# Patient Record
Sex: FEMALE | Race: WHITE | NOT HISPANIC OR LATINO | Employment: PART TIME | URBAN - METROPOLITAN AREA
[De-identification: names, ages, dates, MRNs, and addresses within clinical notes are randomized per-mention and may not be internally consistent; named-entity substitution may affect disease eponyms.]

---

## 2017-04-25 ENCOUNTER — APPOINTMENT (OUTPATIENT)
Dept: ADMISSIONS | Facility: MEDICAL CENTER | Age: 27
End: 2017-04-25
Attending: OBSTETRICS & GYNECOLOGY
Payer: MEDICAID

## 2017-04-26 ENCOUNTER — HOSPITAL ENCOUNTER (OUTPATIENT)
Facility: MEDICAL CENTER | Age: 27
End: 2017-04-26
Attending: OBSTETRICS & GYNECOLOGY | Admitting: OBSTETRICS & GYNECOLOGY
Payer: MEDICAID

## 2017-04-26 VITALS
TEMPERATURE: 98 F | DIASTOLIC BLOOD PRESSURE: 59 MMHG | OXYGEN SATURATION: 98 % | RESPIRATION RATE: 16 BRPM | HEART RATE: 68 BPM | SYSTOLIC BLOOD PRESSURE: 100 MMHG | WEIGHT: 147.49 LBS | BODY MASS INDEX: 23.15 KG/M2 | HEIGHT: 67 IN

## 2017-04-26 PROBLEM — A63.0 CONDYLOMA ACUMINATUM: Status: ACTIVE | Noted: 2017-04-26

## 2017-04-26 LAB
BASOPHILS # BLD AUTO: 0.8 % (ref 0–1.8)
BASOPHILS # BLD: 0.06 K/UL (ref 0–0.12)
EOSINOPHIL # BLD AUTO: 0.16 K/UL (ref 0–0.51)
EOSINOPHIL NFR BLD: 2.1 % (ref 0–6.9)
ERYTHROCYTE [DISTWIDTH] IN BLOOD BY AUTOMATED COUNT: 40.8 FL (ref 35.9–50)
HCG SERPL QL: NEGATIVE
HCT VFR BLD AUTO: 40.8 % (ref 37–47)
HGB BLD-MCNC: 13.3 G/DL (ref 12–16)
IMM GRANULOCYTES # BLD AUTO: 0.03 K/UL (ref 0–0.11)
IMM GRANULOCYTES NFR BLD AUTO: 0.4 % (ref 0–0.9)
LYMPHOCYTES # BLD AUTO: 2.19 K/UL (ref 1–4.8)
LYMPHOCYTES NFR BLD: 28.6 % (ref 22–41)
MCH RBC QN AUTO: 30 PG (ref 27–33)
MCHC RBC AUTO-ENTMCNC: 32.6 G/DL (ref 33.6–35)
MCV RBC AUTO: 91.9 FL (ref 81.4–97.8)
MONOCYTES # BLD AUTO: 0.71 K/UL (ref 0–0.85)
MONOCYTES NFR BLD AUTO: 9.3 % (ref 0–13.4)
NEUTROPHILS # BLD AUTO: 4.5 K/UL (ref 2–7.15)
NEUTROPHILS NFR BLD: 58.8 % (ref 44–72)
NRBC # BLD AUTO: 0 K/UL
NRBC BLD AUTO-RTO: 0 /100 WBC
PLATELET # BLD AUTO: 194 K/UL (ref 164–446)
PMV BLD AUTO: 11.1 FL (ref 9–12.9)
RBC # BLD AUTO: 4.44 M/UL (ref 4.2–5.4)
WBC # BLD AUTO: 7.7 K/UL (ref 4.8–10.8)

## 2017-04-26 PROCEDURE — 700111 HCHG RX REV CODE 636 W/ 250 OVERRIDE (IP)

## 2017-04-26 PROCEDURE — 85025 COMPLETE CBC W/AUTO DIFF WBC: CPT

## 2017-04-26 PROCEDURE — 160038 HCHG SURGERY MINUTES - EA ADDL 1 MIN LEVEL 2: Performed by: OBSTETRICS & GYNECOLOGY

## 2017-04-26 PROCEDURE — 700101 HCHG RX REV CODE 250

## 2017-04-26 PROCEDURE — 110371 HCHG SHELL REV 272: Performed by: OBSTETRICS & GYNECOLOGY

## 2017-04-26 PROCEDURE — 160002 HCHG RECOVERY MINUTES (STAT): Performed by: OBSTETRICS & GYNECOLOGY

## 2017-04-26 PROCEDURE — 160009 HCHG ANES TIME/MIN: Performed by: OBSTETRICS & GYNECOLOGY

## 2017-04-26 PROCEDURE — 160036 HCHG PACU - EA ADDL 30 MINS PHASE I: Performed by: OBSTETRICS & GYNECOLOGY

## 2017-04-26 PROCEDURE — A9270 NON-COVERED ITEM OR SERVICE: HCPCS

## 2017-04-26 PROCEDURE — 88304 TISSUE EXAM BY PATHOLOGIST: CPT

## 2017-04-26 PROCEDURE — 160048 HCHG OR STATISTICAL LEVEL 1-5: Performed by: OBSTETRICS & GYNECOLOGY

## 2017-04-26 PROCEDURE — 500892 HCHG PACK, PERI-GYN: Performed by: OBSTETRICS & GYNECOLOGY

## 2017-04-26 PROCEDURE — 502240 HCHG MISC OR SUPPLY RC 0272: Performed by: OBSTETRICS & GYNECOLOGY

## 2017-04-26 PROCEDURE — 110382 HCHG SHELL REV 271: Performed by: OBSTETRICS & GYNECOLOGY

## 2017-04-26 PROCEDURE — 84703 CHORIONIC GONADOTROPIN ASSAY: CPT

## 2017-04-26 PROCEDURE — 501838 HCHG SUTURE GENERAL: Performed by: OBSTETRICS & GYNECOLOGY

## 2017-04-26 PROCEDURE — 160035 HCHG PACU - 1ST 60 MINS PHASE I: Performed by: OBSTETRICS & GYNECOLOGY

## 2017-04-26 PROCEDURE — A4606 OXYGEN PROBE USED W OXIMETER: HCPCS | Performed by: OBSTETRICS & GYNECOLOGY

## 2017-04-26 PROCEDURE — 700102 HCHG RX REV CODE 250 W/ 637 OVERRIDE(OP)

## 2017-04-26 PROCEDURE — 160027 HCHG SURGERY MINUTES - 1ST 30 MINS LEVEL 2: Performed by: OBSTETRICS & GYNECOLOGY

## 2017-04-26 RX ORDER — OXYCODONE HYDROCHLORIDE 5 MG/1
10 TABLET ORAL
Status: DISCONTINUED | OUTPATIENT
Start: 2017-04-26 | End: 2017-04-26 | Stop reason: HOSPADM

## 2017-04-26 RX ORDER — ONDANSETRON 2 MG/ML
4 INJECTION INTRAMUSCULAR; INTRAVENOUS EVERY 6 HOURS PRN
Status: DISCONTINUED | OUTPATIENT
Start: 2017-04-26 | End: 2017-04-26 | Stop reason: HOSPADM

## 2017-04-26 RX ORDER — ACETAMINOPHEN 500 MG
1000 TABLET ORAL EVERY 6 HOURS
Status: DISCONTINUED | OUTPATIENT
Start: 2017-04-26 | End: 2017-04-26 | Stop reason: HOSPADM

## 2017-04-26 RX ORDER — MIDAZOLAM HYDROCHLORIDE 1 MG/ML
INJECTION INTRAMUSCULAR; INTRAVENOUS
Status: DISCONTINUED
Start: 2017-04-26 | End: 2017-04-26 | Stop reason: HOSPADM

## 2017-04-26 RX ORDER — MORPHINE SULFATE 4 MG/ML
4 INJECTION, SOLUTION INTRAMUSCULAR; INTRAVENOUS
Status: DISCONTINUED | OUTPATIENT
Start: 2017-04-26 | End: 2017-04-26 | Stop reason: HOSPADM

## 2017-04-26 RX ORDER — METOCLOPRAMIDE HYDROCHLORIDE 5 MG/ML
10 INJECTION INTRAMUSCULAR; INTRAVENOUS EVERY 4 HOURS PRN
Status: DISCONTINUED | OUTPATIENT
Start: 2017-04-26 | End: 2017-04-26 | Stop reason: HOSPADM

## 2017-04-26 RX ORDER — OXYCODONE HYDROCHLORIDE 5 MG/1
5 TABLET ORAL
Status: DISCONTINUED | OUTPATIENT
Start: 2017-04-26 | End: 2017-04-26 | Stop reason: HOSPADM

## 2017-04-26 RX ORDER — SODIUM CHLORIDE, SODIUM LACTATE, POTASSIUM CHLORIDE, CALCIUM CHLORIDE 600; 310; 30; 20 MG/100ML; MG/100ML; MG/100ML; MG/100ML
INJECTION, SOLUTION INTRAVENOUS ONCE
Status: COMPLETED | OUTPATIENT
Start: 2017-04-26 | End: 2017-04-26

## 2017-04-26 RX ORDER — SIMETHICONE 80 MG
80 TABLET,CHEWABLE ORAL EVERY 8 HOURS PRN
Status: DISCONTINUED | OUTPATIENT
Start: 2017-04-26 | End: 2017-04-26 | Stop reason: HOSPADM

## 2017-04-26 RX ORDER — LORAZEPAM 2 MG/ML
INJECTION INTRAMUSCULAR
Status: COMPLETED
Start: 2017-04-26 | End: 2017-04-26

## 2017-04-26 RX ORDER — LORAZEPAM 2 MG/ML
0.5 INJECTION INTRAMUSCULAR
Status: DISCONTINUED | OUTPATIENT
Start: 2017-04-26 | End: 2017-04-26 | Stop reason: HOSPADM

## 2017-04-26 RX ORDER — BUPIVACAINE HYDROCHLORIDE AND EPINEPHRINE 2.5; 5 MG/ML; UG/ML
INJECTION, SOLUTION EPIDURAL; INFILTRATION; INTRACAUDAL; PERINEURAL
Status: DISCONTINUED | OUTPATIENT
Start: 2017-04-26 | End: 2017-04-26 | Stop reason: HOSPADM

## 2017-04-26 RX ORDER — IBUPROFEN 400 MG/1
800 TABLET ORAL
Status: DISCONTINUED | OUTPATIENT
Start: 2017-04-26 | End: 2017-04-26 | Stop reason: HOSPADM

## 2017-04-26 RX ORDER — ACETIC ACID 0.25 G/100ML
IRRIGANT IRRIGATION
Status: DISCONTINUED | OUTPATIENT
Start: 2017-04-26 | End: 2017-04-26 | Stop reason: HOSPADM

## 2017-04-26 RX ADMIN — SODIUM CHLORIDE, SODIUM LACTATE, POTASSIUM CHLORIDE, CALCIUM CHLORIDE 1000 ML: 600; 310; 30; 20 INJECTION, SOLUTION INTRAVENOUS at 13:00

## 2017-04-26 RX ADMIN — LORAZEPAM 0.5 MG: 2 INJECTION INTRAMUSCULAR; INTRAVENOUS at 14:48

## 2017-04-26 ASSESSMENT — PAIN SCALES - GENERAL
PAINLEVEL_OUTOF10: 0

## 2017-04-26 NOTE — OR NURSING
RECEIVED FROM OR WITH DR ALAN.  PATIENT AWAKE.  VSS.  DENIES PAIN/NAUSEA.  REQUESTS ANTI-ANXIETY MEDICATION.  DR DORANTES AT BEDSIDE.  ORDERS RECEIVED AND IMPLEMENTED.  1505 ANXIETY IMPROVING.  DRINKING GINGER ALE.   1545 AMBULATES TO BATHROOM; VOID, DRESSED AND FEELS READY FOR DISCHARGE.  NO BLEEDING NOTED.  INSTRUCTIONS GIVE TO PATIENT AND FAMILY.  ALL QUESTIONS ANSWERED.

## 2017-04-26 NOTE — OR SURGEON
Immediate Post-Operative Note      PreOp Diagnosis: 1.  SYMPTOMATIC PERIANAL CONDYLOMA.  2.  INTOLERANT OF IN OFFICE EXCISION.    PostOp Diagnosis: AS ABOVE.  PATHOLOGY PENDING.    Procedure(s):  PELVIC EXAM UNDER ANESTHESIA FOR EXCISION OF PERIANAL CONDYLOMA, REPAIR OF PERIANAL SKIN/TISSUE - Wound Class: Clean Contaminated    Surgeon(s):  Diamond Atkins M.D.    Anesthesiologist/Type of Anesthesia:  Anesthesiologist: Amilcar Clay M.D./General and local anesthetic    Surgical Staff:  Circulator: Kaya Amezquita R.N.; Deborah Guzman R.N.  Scrub Person: Bryan Ward    Specimen: 1.  Perianal condyloma.    Estimated Blood Loss: 10 cc    Findings: EUA - 1 x 2 x 1 cm verrucoid lesion just directly above the periphery of the anal mucosa at the 11:00 position.  3% acetic acid soaked gauze applied to her vulva, no obvious areas of ACWE noted.  Excised with scalpel and sent to pathology.    Bimanual examination - uterus AV mobile and not enlarged.  Bilateral adnexa are palpated and the right adnexa is not enlarged.  Question of left adnexal fullness.    Complications: None apparent.        4/26/2017 2:29 PM Diamond Atkins

## 2017-04-26 NOTE — H&P
PREOPERATIVE DIAGNOSES:  Symptomatic anal condyloma.      PLANNED PROCEDURE:  Examination under anesthesia, excision of anal condyloma   and repair of anal mucosa.      HISTORY OF PRESENT ILLNESS:  The patient is a 27-year-old nulligravid lesbian   woman with a last menstrual period of 04/02/2017 who has a long history of   perineal, vulvar and anal condyloma.  She is seen at the [facility]   by Mecca Cotton, who has performed TCA treatment on the smaller condyloma.    In general, over time this has helped decrease her condyloma symptoms.    However, there is a large 2x2x2 cm condyloma on her anus that her primary care   provider was not comfortable treating.  The patient desires conservative   surgical management and excision of this condyloma.  Risks, benefits and   alternatives of an examination under anesthesia, excision of anal condyloma   and repair and oversewing of anal mucosa discussed with the patient, and she   agrees to proceed.      PAST SURGICAL HISTORY:  None.      PAST MEDICAL HISTORY:  Genital warts.      SOCIAL HISTORY:  She denies alcohol, but is a one-half pack per day smoker as   well as a frequent marijuana smoker.      MEDICATIONS:  None.      BIRTH CONTROL:  None.      ALLERGIES:  No known drug allergies.      OBSTETRIC HISTORY:  None.  She denies a history of abnormal Pap smears.  She   does have history of genital warts.      PHYSICAL EXAMINATION:    VITAL SIGNS:  Stable, afebrile.    GENERAL:  Alert, awake and oriented x3 in no acute distress.    LUNGS:  Clear to auscultation bilaterally.    HEART:  Regular rate and rhythm.  No murmurs, rubs or gallops.  S1, S2 intact.      ABDOMEN:  Soft and nontender.    GENITOURINARY:  Deferred.    EXTREMITIES:  No clubbing, cyanosis or edema.      ASSESSMENT AND PLAN:  A 27-year-old nulligravid lesbian woman with a long   history of vulvar condyloma with a large anal condyloma that is not amenable   to in-office surgical excision.  The  patient desires outpatient conservative   surgical management.      PLAN:  We will proceed to the operating room for an examination under   anesthesia, excision of anal condyloma and repair of anal mucosa.       ____________________________________     MD GARTH PIERSON / NTS    DD:  04/25/2017 17:12:56  DT:  04/25/2017 18:43:12    D#:  [id number]  Job#:  [id number]    cc: JORDY VOGEL

## 2017-04-26 NOTE — DISCHARGE INSTRUCTIONS
ACTIVITY: Rest and take it easy for the first 24 hours.  A responsible adult is recommended to remain with you during that time.  It is normal to feel sleepy.  We encourage you to not do anything that requires balance, judgment or coordination.    MILD FLU-LIKE SYMPTOMS ARE NORMAL. YOU MAY EXPERIENCE GENERALIZED MUSCLE ACHES, THROAT IRRITATION, HEADACHE AND/OR SOME NAUSEA.    FOR 24 HOURS DO NOT:  Drive, operate machinery or run household appliances.  Drink beer or alcoholic beverages.   Make important decisions or sign legal documents.    SPECIAL INSTRUCTIONS: Call for a temp >100.4, heavy vaginal bleeding, foul smelling discharge, or if your incision oozes blood, pus, or fluid.  No driving x 2 weeks or while on narcotics, no lifting >20 pounds x 4 weeks, and pelvic rest x 6 weeks.  Keep bowels soft***    DIET: To avoid nausea, slowly advance diet as tolerated, avoiding spicy or greasy foods for the first day.  Add more substantial food to your diet according to your physician's instructions.  Babies can be fed formula or breast milk as soon as they are hungry.  INCREASE FLUIDS AND FIBER TO AVOID CONSTIPATION.    SURGICAL DRESSING/BATHING: ***    FOLLOW-UP APPOINTMENT:  A follow-up appointment should be arranged with your doctor; call to schedule.    You should CALL YOUR PHYSICIAN if you develop:  Fever greater than 101 degrees F.  Pain not relieved by medication, or persistent nausea or vomiting.  Excessive bleeding (blood soaking through dressing) or unexpected drainage from the wound.  Extreme redness or swelling around the incision site, drainage of pus or foul smelling drainage.  Inability to urinate or empty your bladder within 8 hours.  Problems with breathing or chest pain.    You should call 911 if you develop problems with breathing or chest pain.  If you are unable to contact your doctor or surgical center, you should go to the nearest emergency room or urgent care center.   physician's telephone #:  *[phone]**    If any questions arise, call your doctor.  If your doctor is not available, please feel free to call the Surgical Center at [phone].  The Center is open Monday through Friday from 7AM to 7PM.  You can also call the HEALTH HOTLINE open 24 hours/day, 7 days/week and speak to a nurse at [phone], or toll free at [phone].    A registered nurse may call you a few days after your surgery to see how you are doing after your procedure.    MEDICATIONS: Resume taking daily medication.  Take prescribed pain medication with food.  If no medication is prescribed, you may take non-aspirin pain medication if needed.  PAIN MEDICATION CAN BE VERY CONSTIPATING.  Take a stool softener or laxative such as senokot, pericolace, or milk of magnesia if needed.    Prescription given for *NONE**.  Last pain medication given at *NONE**.    If your physician has prescribed pain medication that includes Acetaminophen (Tylenol), do not take additional Acetaminophen (Tylenol) while taking the prescribed medication.    Depression / Suicide Risk    As you are discharged from this [facility] facility, it is important to learn how to keep safe from harming yourself.    Recognize the warning signs:  · Abrupt changes in personality, positive or negative- including increase in energy   · Giving away possessions  · Change in eating patterns- significant weight changes-  positive or negative  · Change in sleeping patterns- unable to sleep or sleeping all the time   · Unwillingness or inability to communicate  · Depression  · Unusual sadness, discouragement and loneliness  · Talk of wanting to die  · Neglect of personal appearance   · Rebelliousness- reckless behavior  · Withdrawal from people/activities they love  · Confusion- inability to concentrate     If you or a loved one observes any of these behaviors or has concerns about self-harm, here's what you can do:  · Talk about it- your feelings and reasons for harming  yourself  · Remove any means that you might use to hurt yourself (examples: pills, rope, extension cords, firearm)  · Get professional help from the community (Mental Health, Substance Abuse, psychological counseling)  · Do not be alone:Call your Safe Contact- someone whom you trust who will be there for you.  · Call your local CRISIS HOTLINE 066-6052 or 391-495-7411  · Call your local Children's Mobile Crisis Response Team [place] (419) 072-4306 or www.[employer]  · Call the toll free National Suicide Prevention Hotlines   · National Suicide Prevention Lifeline 303-293-XLSY (6904)  · National Hope Line Network 800-SUICIDE (893-5934)

## 2017-04-26 NOTE — IP AVS SNAPSHOT
4/26/2017    Celsa Unger  [address] [address]  [place] [address]    Dear Celsa:    [facility] wants to ensure your discharge home is safe and you or your loved ones have had all of your questions answered regarding your care after you leave the hospital.    Below is a list of resources and contact information should you have any questions regarding your hospital stay, follow-up instructions, or active medical symptoms.    Questions or Concerns Regarding… Contact   Medical Questions Related to Your Discharge  (7 days a week, 8am-5pm) Contact a Nurse Care Coordinator   [phone]   Medical Questions Not Related to Your Discharge  (24 hours a day / 7 days a week)  Contact the Nurse Health Line   [phone]    Medications or Discharge Instructions Refer to your discharge packet   or contact your [facility] Primary Care Provider   [phone]   Follow-up Appointment(s) Schedule your appointment via FanXT   or contact Scheduling [phone]   Billing Review your statement via FanXT  or contact Billing [phone]   Medical Records Review your records via FanXT   or contact Medical Records [phone]     You may receive a telephone call within two days of discharge. This call is to make certain you understand your discharge instructions and have the opportunity to have any questions answered. You can also easily access your medical information, test results and upcoming appointments via the FanXT free online health management tool. You can learn more and sign up at [employer]/FanXT. For assistance setting up your FanXT account, please call [phone].    Once again, we want to ensure your discharge home is safe and that you have a clear understanding of any next steps in your care. If you have any questions or concerns, please do not hesitate to contact us, we are here for you. Thank you for choosing [facility] for your healthcare needs.    Sincerely,    Your [facility] Healthcare Team

## 2017-04-26 NOTE — IP AVS SNAPSHOT
" Home Care Instructions                                                                                                                Name:Celsa Unger  Medical Record Number:[id number]  CSN: [id number]    YOB: 1990   Age: 27 y.o.  Sex: female  HT:1.702 m (5' 7\") WT: 66.9 kg (147 lb 7.8 oz)          Admit Date: 4/26/2017     Discharge Date:   Today's Date: 4/26/2017  Attending Doctor:  Diamond Atkins, *                  Allergies:  Review of patient's allergies indicates no known allergies.              Follow-up Information     1. Follow up with Diamond Atkins M.D. In 2 weeks.    Specialty:  OB/Gyn    Why:  Postoperative examination.    Contact information    [address] [facility] [address]  [facility] [address] [phone]          Discharge Instructions         ACTIVITY: Rest and take it easy for the first 24 hours.  A responsible adult is recommended to remain with you during that time.  It is normal to feel sleepy.  We encourage you to not do anything that requires balance, judgment or coordination.    MILD FLU-LIKE SYMPTOMS ARE NORMAL. YOU MAY EXPERIENCE GENERALIZED MUSCLE ACHES, THROAT IRRITATION, HEADACHE AND/OR SOME NAUSEA.    FOR 24 HOURS DO NOT:  Drive, operate machinery or run household appliances.  Drink beer or alcoholic beverages.   Make important decisions or sign legal documents.    SPECIAL INSTRUCTIONS: Call for a temp >100.4, heavy vaginal bleeding, foul smelling discharge, or if your incision oozes blood, pus, or fluid.  No driving x 2 weeks or while on narcotics, no lifting >20 pounds x 4 weeks, and pelvic rest x 6 weeks.  Keep bowels soft***    DIET: To avoid nausea, slowly advance diet as tolerated, avoiding spicy or greasy foods for the first day.  Add more substantial food to your diet according to your physician's instructions.  Babies can be fed formula or breast milk as soon as they are hungry.  INCREASE FLUIDS AND FIBER TO AVOID CONSTIPATION.    SURGICAL " DRESSING/BATHING: ***    FOLLOW-UP APPOINTMENT:  A follow-up appointment should be arranged with your doctor; call to schedule.    You should CALL YOUR PHYSICIAN if you develop:  Fever greater than 101 degrees F.  Pain not relieved by medication, or persistent nausea or vomiting.  Excessive bleeding (blood soaking through dressing) or unexpected drainage from the wound.  Extreme redness or swelling around the incision site, drainage of pus or foul smelling drainage.  Inability to urinate or empty your bladder within 8 hours.  Problems with breathing or chest pain.    You should call 911 if you develop problems with breathing or chest pain.  If you are unable to contact your doctor or surgical center, you should go to the nearest emergency room or urgent care center.   physician's telephone #: *[phone]**    If any questions arise, call your doctor.  If your doctor is not available, please feel free to call the Surgical Center at [phone].  The Center is open Monday through Friday from 7AM to 7PM.  You can also call the [facility] HOTLINE open 24 hours/day, 7 days/week and speak to a nurse at [phone], or toll free at [phone].    A registered nurse may call you a few days after your surgery to see how you are doing after your procedure.    MEDICATIONS: Resume taking daily medication.  Take prescribed pain medication with food.  If no medication is prescribed, you may take non-aspirin pain medication if needed.  PAIN MEDICATION CAN BE VERY CONSTIPATING.  Take a stool softener or laxative such as senokot, pericolace, or milk of magnesia if needed.    Prescription given for *NONE**.  Last pain medication given at *NONE**.    If your physician has prescribed pain medication that includes Acetaminophen (Tylenol), do not take additional Acetaminophen (Tylenol) while taking the prescribed medication.    Depression / Suicide Risk    As you are discharged from this [facility] facility, it is important to learn how  to keep safe from harming yourself.    Recognize the warning signs:  · Abrupt changes in personality, positive or negative- including increase in energy   · Giving away possessions  · Change in eating patterns- significant weight changes-  positive or negative  · Change in sleeping patterns- unable to sleep or sleeping all the time   · Unwillingness or inability to communicate  · Depression  · Unusual sadness, discouragement and loneliness  · Talk of wanting to die  · Neglect of personal appearance   · Rebelliousness- reckless behavior  · Withdrawal from people/activities they love  · Confusion- inability to concentrate     If you or a loved one observes any of these behaviors or has concerns about self-harm, here's what you can do:  · Talk about it- your feelings and reasons for harming yourself  · Remove any means that you might use to hurt yourself (examples: pills, rope, extension cords, firearm)  · Get professional help from the community (Mental Health, Substance Abuse, psychological counseling)  · Do not be alone:Call your Safe Contact- someone whom you trust who will be there for you.  · Call your local CRISIS HOTLINE 195-2013 or 373-258-3063  · Call your local Children's Mobile Crisis Response Team [place] (125) 503-7456 or www.[employer]  · Call the toll free National Suicide Prevention Hotlines   · National Suicide Prevention Lifeline 110-293-GMGX (9572)  · National Hope Line Network 800-SUICIDE (486-9135)       Medication List      Notice     You have not been prescribed any medications.            Medication Information     Above and/or attached are the medications your physician expects you to take upon discharge. Review all of your home medications and newly ordered medications with your doctor and/or pharmacist. Follow medication instructions as directed by your doctor and/or pharmacist. Please keep your medication list with you and share with your physician. Update the information when  medications are discontinued, doses are changed, or new medications (including over-the-counter products) are added; and carry medication information at all times in the event of emergency situations.        Resources     Quit Smoking / Tobacco Use:    I understand the use of any tobacco products increases my chance of suffering from future heart disease or stroke and could cause other illnesses which may shorten my life. Quitting the use of tobacco products is the single most important thing I can do to improve my health. For further information on smoking / tobacco cessation call a Toll Free Quit Line at 1-373.708.2925 (*National Cancer [place]) or 1-607.860.7900 (American Lung Association) or you can access the web based program at www.lungusa.org.    [place] Tobacco Users Help Line:  (731) 831-7470       Toll Free: 1-969.421.2327  Quit Tobacco Program [facility] (866)135-3499    Crisis Hotline:    [place] Crisis Hotline:  2-576-KRZNQJL or 1-416.522.7190    [place] Crisis Hotline:    1-148.934.7755 or 749-111-5290    Discharge Survey:   Thank you for choosing [facility]. We hope we did everything we could to make your hospital stay a pleasant one. You may be receiving a survey and we would appreciate your time and participation in answering the questions. Your input is very valuable to us in our efforts to improve our service to our patients and their families.            Signatures     My signature on this form indicates that:    1. I acknowledge receipt and understanding of these Home Care Instruction.  2. My questions regarding this information have been answered to my satisfaction.  3. I have formulated a plan with my discharge nurse to obtain my prescribed medications for home.    __________________________________      __________________________________                   Patient Signature                                 Guardian/Responsible Adult  Signature      __________________________________                 __________       ________                       Nurse Signature                                               Date                 Time

## 2017-04-27 NOTE — OP REPORT
DATE OF SERVICE:  04/26/2017    PREOPERATIVE DIAGNOSES:  1.  Symptomatic perianal condyloma.  2.  Intolerant of in-office excision.    POSTOPERATIVE DIAGNOSES:  1.  Symptomatic perianal condyloma.  2.  Intolerant of in-office excision.  3.  Pathology pending.    PROCEDURES PERFORMED:  Examination under anesthesia, excision of perianal   condyloma, repair of perianal skin.    SURGEON:  Diamond Atkins MD    ANESTHESIOLOGIST:  Amilcar Clay MD    ANESTHESIA:  General endotracheal tube anesthesia and local anesthetic.    SPECIMEN:  Perianal condyloma.    ESTIMATED BLOOD LOSS:  10 mL.    FINDINGS:  On examination under anesthesia, there is a 1 x 2 x 1 cm   verrucoid-appearing lesion just directly above the periphery of the anal   mucosa at the 11 o'clock position.  No other lesions noted, 3% acetic acid   soaked gauze applied to the vulva, no obvious areas of acetowhite epithelium   noted.  The tissue was excised with the scalpel and sent to pathology.    Bimanual examination, uterus is anteverted, mobile, and not enlarged.    Bilateral adnexa are palpated and the right adnexa is not enlarged.  There was   a question of an adnexal fullness.    COMPLICATIONS:  None apparent.    INDICATIONS FOR PROCEDURE:  The patient is a 27-year-old nulligravid lesbian   woman with a last menstrual period of 04/02/2017 who has a long history of   perineal vulvar and anal condyloma.  The patient is seen at the [facility] by Mecca Cotton who has performed TCA treatment on the smaller   condyloma.  The patient has had a persistent perianal condyloma that has   enlarged in the recent months and the patient is intolerant of in-office   excision and desires conservative surgical management.    DETAILS OF PROCEDURE:  The patient was taken to the operating room where she   underwent general endotracheal tube anesthesia.  She was then placed in the   dorsal lithotomy position in the [facility].  Examination under    anesthesia was performed and the findings are noted as above.  The tissue was   examined and 3% acetic acid soaked gauzes were placed across the vulva and   perianal region.  There were no obvious areas of acetowhite epithelium.  The   perianal condyloma was grasped with Allis clamps, elevated and the mucosal   tissue was injected with 0.25% Marcaine with epinephrine.  An elliptical-type   incision was made around the perianal tissue.  The tissue was sent to   pathology.  The wound was closed in a 2-layer closure with 3-0 Vicryl on an SH   needle.  The first layer was an interrupted layer to reapproximate the wound   and provide hemostasis and the remainder of the tissue was reapproximated in   an interrupted fashion.  The wound was found to be hemostatic.  Silvadene   cream was applied to the wound.  The patient was then taken out of dorsal   lithotomy position.  She was then extubated and taken to the PACU in stable   condition.       ____________________________________     MD GARTH PIERSON / NTS    DD:  04/26/2017 14:38:45  DT:  04/26/2017 17:53:23    D#:  [id number]  Job#:  [id number]    cc: JORDY VOGEL

## 2018-04-11 ENCOUNTER — APPOINTMENT (OUTPATIENT)
Dept: ADMISSIONS | Facility: MEDICAL CENTER | Age: 28
End: 2018-04-11
Attending: OBSTETRICS & GYNECOLOGY
Payer: MEDICAID

## 2018-04-17 ENCOUNTER — HOSPITAL ENCOUNTER (OUTPATIENT)
Facility: MEDICAL CENTER | Age: 28
End: 2018-04-17
Attending: OBSTETRICS & GYNECOLOGY | Admitting: OBSTETRICS & GYNECOLOGY
Payer: MEDICAID

## 2018-04-17 VITALS
BODY MASS INDEX: 26.16 KG/M2 | TEMPERATURE: 98 F | HEIGHT: 67 IN | DIASTOLIC BLOOD PRESSURE: 64 MMHG | HEART RATE: 92 BPM | SYSTOLIC BLOOD PRESSURE: 102 MMHG | RESPIRATION RATE: 18 BRPM | WEIGHT: 166.67 LBS | OXYGEN SATURATION: 100 %

## 2018-04-17 DIAGNOSIS — G89.18 POST-OPERATIVE PAIN: ICD-10-CM

## 2018-04-17 LAB
BASOPHILS # BLD AUTO: 0.6 % (ref 0–1.8)
BASOPHILS # BLD: 0.07 K/UL (ref 0–0.12)
EOSINOPHIL # BLD AUTO: 0.16 K/UL (ref 0–0.51)
EOSINOPHIL NFR BLD: 1.3 % (ref 0–6.9)
ERYTHROCYTE [DISTWIDTH] IN BLOOD BY AUTOMATED COUNT: 42.7 FL (ref 35.9–50)
HCG SERPL QL: NEGATIVE
HCT VFR BLD AUTO: 41.6 % (ref 37–47)
HGB BLD-MCNC: 13.5 G/DL (ref 12–16)
IMM GRANULOCYTES # BLD AUTO: 0.06 K/UL (ref 0–0.11)
IMM GRANULOCYTES NFR BLD AUTO: 0.5 % (ref 0–0.9)
LYMPHOCYTES # BLD AUTO: 2.22 K/UL (ref 1–4.8)
LYMPHOCYTES NFR BLD: 17.8 % (ref 22–41)
MCH RBC QN AUTO: 30.1 PG (ref 27–33)
MCHC RBC AUTO-ENTMCNC: 32.5 G/DL (ref 33.6–35)
MCV RBC AUTO: 92.7 FL (ref 81.4–97.8)
MONOCYTES # BLD AUTO: 0.91 K/UL (ref 0–0.85)
MONOCYTES NFR BLD AUTO: 7.3 % (ref 0–13.4)
NEUTROPHILS # BLD AUTO: 9.05 K/UL (ref 2–7.15)
NEUTROPHILS NFR BLD: 72.5 % (ref 44–72)
NRBC # BLD AUTO: 0 K/UL
NRBC BLD-RTO: 0 /100 WBC
PLATELET # BLD AUTO: 230 K/UL (ref 164–446)
PMV BLD AUTO: 11.6 FL (ref 9–12.9)
RBC # BLD AUTO: 4.49 M/UL (ref 4.2–5.4)
WBC # BLD AUTO: 12.5 K/UL (ref 4.8–10.8)

## 2018-04-17 PROCEDURE — 501330 HCHG SET, CYSTO IRRIG TUBING: Performed by: OBSTETRICS & GYNECOLOGY

## 2018-04-17 PROCEDURE — 700111 HCHG RX REV CODE 636 W/ 250 OVERRIDE (IP)

## 2018-04-17 PROCEDURE — 85025 COMPLETE CBC W/AUTO DIFF WBC: CPT

## 2018-04-17 PROCEDURE — 160029 HCHG SURGERY MINUTES - 1ST 30 MINS LEVEL 4: Performed by: OBSTETRICS & GYNECOLOGY

## 2018-04-17 PROCEDURE — 84703 CHORIONIC GONADOTROPIN ASSAY: CPT

## 2018-04-17 PROCEDURE — 160041 HCHG SURGERY MINUTES - EA ADDL 1 MIN LEVEL 4: Performed by: OBSTETRICS & GYNECOLOGY

## 2018-04-17 PROCEDURE — 501582 HCHG TROCAR, THRD BLADED: Performed by: OBSTETRICS & GYNECOLOGY

## 2018-04-17 PROCEDURE — 501838 HCHG SUTURE GENERAL: Performed by: OBSTETRICS & GYNECOLOGY

## 2018-04-17 PROCEDURE — 88112 CYTOPATH CELL ENHANCE TECH: CPT

## 2018-04-17 PROCEDURE — 160036 HCHG PACU - EA ADDL 30 MINS PHASE I: Performed by: OBSTETRICS & GYNECOLOGY

## 2018-04-17 PROCEDURE — 160048 HCHG OR STATISTICAL LEVEL 1-5: Performed by: OBSTETRICS & GYNECOLOGY

## 2018-04-17 PROCEDURE — 502240 HCHG MISC OR SUPPLY RC 0272: Performed by: OBSTETRICS & GYNECOLOGY

## 2018-04-17 PROCEDURE — 700104 HCHG RX REV CODE 254

## 2018-04-17 PROCEDURE — 500886 HCHG PACK, LAPAROSCOPY: Performed by: OBSTETRICS & GYNECOLOGY

## 2018-04-17 PROCEDURE — 700101 HCHG RX REV CODE 250

## 2018-04-17 PROCEDURE — A9270 NON-COVERED ITEM OR SERVICE: HCPCS

## 2018-04-17 PROCEDURE — A4338 INDWELLING CATHETER LATEX: HCPCS | Performed by: OBSTETRICS & GYNECOLOGY

## 2018-04-17 PROCEDURE — 501394 HCHG SOLUTION SORBITOL 3% 3000ML BAG: Performed by: OBSTETRICS & GYNECOLOGY

## 2018-04-17 PROCEDURE — 160035 HCHG PACU - 1ST 60 MINS PHASE I: Performed by: OBSTETRICS & GYNECOLOGY

## 2018-04-17 PROCEDURE — 500002 HCHG ADHESIVE, DERMABOND: Performed by: OBSTETRICS & GYNECOLOGY

## 2018-04-17 PROCEDURE — 160002 HCHG RECOVERY MINUTES (STAT): Performed by: OBSTETRICS & GYNECOLOGY

## 2018-04-17 PROCEDURE — 88305 TISSUE EXAM BY PATHOLOGIST: CPT | Mod: 59

## 2018-04-17 PROCEDURE — A6402 STERILE GAUZE <= 16 SQ IN: HCPCS | Performed by: OBSTETRICS & GYNECOLOGY

## 2018-04-17 PROCEDURE — 160009 HCHG ANES TIME/MIN: Performed by: OBSTETRICS & GYNECOLOGY

## 2018-04-17 PROCEDURE — 700102 HCHG RX REV CODE 250 W/ 637 OVERRIDE(OP)

## 2018-04-17 PROCEDURE — 502704 HCHG DEVICE, LIGASURE IMPACT: Performed by: OBSTETRICS & GYNECOLOGY

## 2018-04-17 PROCEDURE — 502587 HCHG PACK, D&C: Performed by: OBSTETRICS & GYNECOLOGY

## 2018-04-17 RX ORDER — OXYCODONE HCL 5 MG/5 ML
SOLUTION, ORAL ORAL
Status: COMPLETED
Start: 2018-04-17 | End: 2018-04-17

## 2018-04-17 RX ORDER — ONDANSETRON 4 MG/1
4 TABLET, ORALLY DISINTEGRATING ORAL EVERY 6 HOURS PRN
Qty: 10 TAB | Refills: 0 | Status: ON HOLD | OUTPATIENT
Start: 2018-04-17 | End: 2018-07-11

## 2018-04-17 RX ORDER — OXYCODONE HYDROCHLORIDE 5 MG/1
10 TABLET ORAL
Status: DISCONTINUED | OUTPATIENT
Start: 2018-04-17 | End: 2018-04-17 | Stop reason: HOSPADM

## 2018-04-17 RX ORDER — IBUPROFEN 800 MG/1
800 TABLET ORAL EVERY 8 HOURS PRN
Qty: 30 TAB | Refills: 3 | Status: SHIPPED | OUTPATIENT
Start: 2018-04-17

## 2018-04-17 RX ORDER — ACETAMINOPHEN 500 MG
1000 TABLET ORAL EVERY 6 HOURS
Status: DISCONTINUED | OUTPATIENT
Start: 2018-04-17 | End: 2018-04-17 | Stop reason: HOSPADM

## 2018-04-17 RX ORDER — MORPHINE SULFATE 4 MG/ML
6 INJECTION, SOLUTION INTRAMUSCULAR; INTRAVENOUS
Status: DISCONTINUED | OUTPATIENT
Start: 2018-04-17 | End: 2018-04-17 | Stop reason: HOSPADM

## 2018-04-17 RX ORDER — SENNA AND DOCUSATE SODIUM 50; 8.6 MG/1; MG/1
1 TABLET, FILM COATED ORAL 2 TIMES DAILY
Qty: 60 TAB | Refills: 1 | Status: ON HOLD | OUTPATIENT
Start: 2018-04-17 | End: 2018-07-11

## 2018-04-17 RX ORDER — OXYCODONE HYDROCHLORIDE AND ACETAMINOPHEN 5; 325 MG/1; MG/1
1-2 TABLET ORAL EVERY 4 HOURS PRN
Qty: 30 TAB | Refills: 0 | Status: SHIPPED | OUTPATIENT
Start: 2018-04-17 | End: 2018-04-24

## 2018-04-17 RX ORDER — BUPIVACAINE HYDROCHLORIDE AND EPINEPHRINE 2.5; 5 MG/ML; UG/ML
INJECTION, SOLUTION EPIDURAL; INFILTRATION; INTRACAUDAL; PERINEURAL
Status: DISCONTINUED | OUTPATIENT
Start: 2018-04-17 | End: 2018-04-17 | Stop reason: HOSPADM

## 2018-04-17 RX ORDER — SIMETHICONE 80 MG
80 TABLET,CHEWABLE ORAL EVERY 8 HOURS PRN
Status: DISCONTINUED | OUTPATIENT
Start: 2018-04-17 | End: 2018-04-17 | Stop reason: HOSPADM

## 2018-04-17 RX ORDER — ONDANSETRON 2 MG/ML
4 INJECTION INTRAMUSCULAR; INTRAVENOUS EVERY 6 HOURS PRN
Status: DISCONTINUED | OUTPATIENT
Start: 2018-04-17 | End: 2018-04-17 | Stop reason: HOSPADM

## 2018-04-17 RX ORDER — OXYCODONE HYDROCHLORIDE 5 MG/1
2.5 TABLET ORAL
Status: DISCONTINUED | OUTPATIENT
Start: 2018-04-17 | End: 2018-04-17 | Stop reason: HOSPADM

## 2018-04-17 RX ORDER — IBUPROFEN 200 MG
800 TABLET ORAL
Status: DISCONTINUED | OUTPATIENT
Start: 2018-04-17 | End: 2018-04-17 | Stop reason: HOSPADM

## 2018-04-17 RX ORDER — SODIUM CHLORIDE, SODIUM LACTATE, POTASSIUM CHLORIDE, CALCIUM CHLORIDE 600; 310; 30; 20 MG/100ML; MG/100ML; MG/100ML; MG/100ML
INJECTION, SOLUTION INTRAVENOUS CONTINUOUS
Status: DISCONTINUED | OUTPATIENT
Start: 2018-04-17 | End: 2018-04-17 | Stop reason: HOSPADM

## 2018-04-17 RX ADMIN — SODIUM CHLORIDE, SODIUM LACTATE, POTASSIUM CHLORIDE, CALCIUM CHLORIDE 1000 ML: 600; 310; 30; 20 INJECTION, SOLUTION INTRAVENOUS at 12:30

## 2018-04-17 RX ADMIN — OXYCODONE HYDROCHLORIDE 10 MG: 5 SOLUTION ORAL at 17:00

## 2018-04-17 ASSESSMENT — PAIN SCALES - GENERAL
PAINLEVEL_OUTOF10: 2
PAINLEVEL_OUTOF10: 0
PAINLEVEL_OUTOF10: 0
PAINLEVEL_OUTOF10: 2
PAINLEVEL_OUTOF10: 0
PAINLEVEL_OUTOF10: 6
PAINLEVEL_OUTOF10: 3
PAINLEVEL_OUTOF10: 0

## 2018-04-17 NOTE — OR SURGEON
Immediate Post OP Note    PreOp Diagnosis: 1.  Chronic pelvic pain.  2.  Dysmenorrhea.  3.  Menorrhagia.  4.  Possible endometrial polyp or submucosal fibroid.  5.  Bladder pain and urinary frequency.  6.  Desires conservative surgical management.    PostOp Diagnosis: As above.  Interstitial cystitis.  Endometriosis. Pelvic adhesive disease.  Left ovarian cyst.  Procedure(s):  PELVIC EXAM UNDER ANESTHESIA - Wound Class: Clean Contaminated  PELVISCOPY/WASHINGS/PERITONEAL BIOPSIES/INCISION/DRAINAGE OF RIGHT HEMORRHAGIC OVARIAN CYST - Wound Class: Clean  LYSIS ADHESIONS GYN/, EXCISION/ FULGURATION OF ENDOMETRIOSIS/PLACEMENT OF MAGDA - Wound Class: Clean  HYSTEROSCOPY WITH MYOSURE/ ENDOMETRIAL CURRETTAGE - Wound Class: Clean Contaminated  HYSTEROSCOPY WITH VIDEO OPERATIVE - Wound Class: Clean Contaminated  HYSTEROSCOPY WITH VIDEO DIAGNOSTIC - Wound Class: Clean Contaminated  CYSTOSCOPY/ WITH BLADDER DISTENTION - Wound Class: Clean Contaminated    Surgeon(s):  Diamond Atkins M.D.    Anesthesiologist/Type of Anesthesia:  Anesthesiologist: Warren Moncada M.D./General and local anesthetic.    Surgical Staff:  Circulator: Becky Lobo R.N.  Scrub Person: Tiarra Trinidad    Specimens removed if any:  1.  Washings.  2.  Peritoneal biopsies.  3.  Ovarian biopsies.  4.  Endometrial curettings.      Estimated Blood Loss: <25 cc    Findings: EUA: uterus is AV mobile and 6 weeks size.  No adnexal masses palpated.  Perineum appears without obvious masses or lesions.  Area of previous condyloma excision appears well healed without obvious dehiscence or bleeding noted.    Laparoscopic findings: normal bilateral tubes.  Normal right ovary.  Left ovary with a 1 cm hemorrhagic cyst filled with bloody fluid.  Punctate erythematous endometriosis implants noted on the ovarian cortex near the hemorrhagic cyst.  Bilateral ureters identified.  Pelvic peritoneum (anterior and posterior cul-de-sac) notable for dilated  and erythematous blood vessels.  Areas consistent with endometriosis noted but no obvious powder burn implants.  Bluish hue noted near the left IP ligament/ureter which represents likely endometriosis implant but in an area that is too dangerous to excise or fulgurate.  Left pelvic side wall sigmoid colon adhesions possibly related to endometriosis or possibly physiologic excised but also near the blood supply the the ovary and adherent to the colon thus unsafe to excise extensively.  Normal upper abdomen.    Cystoscopic findings: bilateral ureteral orifices seen and efflux of urine noted.  Bladder mucosa appears hyperemic with dilated and erythematous blood vessels.  No evidence of bladder masses or lesions.    Hysteroscopic findings: bilateral tubal ostia area identified.  No obvious endometrial polyps or submucosal fibroids or other masses or lesions noted.      Complications: none apparent        4/17/2018 2:13 PM Diamond Atkins M.D.

## 2018-04-17 NOTE — H&P
DATE OF PROCEDURE:  04/17/2018    PREOPERATIVE DIAGNOSES:  1.  Chronic pelvic pain.  2.  Dysmenorrhea.  3.  Abnormal uterine bleeding.  4.  Possible endometrial polyp or submucosal fibroid.  5.  Bladder pain.  6.  Desires conservative surgical management.    PLANNED PROCEDURE:  Examination under anesthesia, exploratory laparoscopy,   lysis of adhesions, excision and fulguration of endometriosis, implants,   diagnostic and operative hysteroscopy with excision of polyp or fibroid with   the MyoSure, endometrial curettage, cystoscopy, and bladder distention.    HISTORY OF PRESENT ILLNESS:  The patient is a 28-year-old nulligravid and   sexually active woman in a same sex relationship with a last menstrual period   of 04/02/2018, who has a long history of dysmenorrhea, abnormal uterine   bleeding and bladder pain, who desires conservative surgical management.  The   patient underwent a pelvic ultrasound on 10/26/2017, which demonstrated that   her uterus measured 7.8x3.6x3.7 cm.  Her endometrium measured 1.2 cm in   thickness.  Her right ovary measured 2.3x1.7x1.8 cm and containing 1.2 cm   simple ovarian cyst.  The left ovary measured 2.2x1.4x2.1 cm and was without   ovarian cyst.  There was no pelvic free fluid.  The patient has been taking   Flexeril 5 mg as needed 3 times a day for her pelvic pain and cramping as well   as ibuprofen.  She has never had an exploratory laparoscopy to diagnose her   with endometriosis.  The patient also has abnormal menstrual period with heavy   menses and intermenstrual bleeding.  In addition, she has bladder pain and   had been evaluated and tested negative for bladder infections.    The risks, benefits and alternatives of an examination under anesthesia,   exploratory laparoscopy, lysis of adhesions, excision and fulguration of   endometriosis implants, diagnostic and operative hysteroscopy, excision of   polyp or fibroid with the MyoSure, endometrial curettage and cystoscopy with    bladder distention were discussed with the patient and she agrees to proceed.    PAST MEDICAL HISTORY:  Chronic pelvic pain, low back pain, abnormal uterine   bleeding.    MEDICATIONS:  Cyclobenzaprine 5 mg p.o. t.i.d. originally prescribed by her   primary care physician.    ALLERGIES:  No known drug allergies.    SOCIAL HISTORY:  She has a partner.  She denies alcohol, tobacco, or drugs of   abuse.    PAST SURGICAL HISTORY:  None.    PHYSICAL EXAMINATION:  VITAL SIGNS:  Stable.  She is afebrile.  GENERAL:  Alert, awake and oriented x3, in no acute distress.  LUNGS:  Clear to auscultation bilaterally.  HEART:  Regular rate and rhythm.  No murmurs, rubs or gallops.  S1, S2 intact.  ABDOMEN:  Soft and nontender.  GENITOURINARY:  Deferred.  EXTREMITIES:  No clubbing, cyanosis, or edema.    Preoperative laboratory studies are pending.    ASSESSMENT AND PLAN:  A 28-year-old nulligravid, sexually active woman in same   sex relationship with a last menstrual period of 04/02/2018, who has a long   history of dysmenorrhea, menorrhagia and bladder pain, who has never been   evaluated for endometriosis and may have an endometrial polyp or fibroid and   possibly interstitial cystitis, who desires conservative surgical management.    PLAN:  We will proceed to the operating room for an examination under   anesthesia, exploratory laparoscopy, lysis of adhesions, excision and   fulguration of endometriosis implants, diagnostic and operative hysteroscopy,   excision of polyp or fibroid with the MyoSure, endometrial curettage, and   cystoscopy with bladder distention.  Risks, benefits and alternatives were   discussed and the patient agrees to proceed.       ____________________________________     SARAHY ADAMES MD    HTA / NTS    DD:  04/16/2018 16:41:29  DT:  04/16/2018 17:14:23    D#:  [id number]  Job#:  [id number]

## 2018-04-18 NOTE — DISCHARGE INSTRUCTIONS
ACTIVITY: Rest and take it easy for the first 24 hours.  A responsible adult is recommended to remain with you during that time.  It is normal to feel sleepy.  We encourage you to not do anything that requires balance, judgment or coordination.    MILD FLU-LIKE SYMPTOMS ARE NORMAL. YOU MAY EXPERIENCE GENERALIZED MUSCLE ACHES, THROAT IRRITATION, HEADACHE AND/OR SOME NAUSEA.    FOR 24 HOURS DO NOT:  Drive, operate machinery or run household appliances.  Drink beer or alcoholic beverages.   Make important decisions or sign legal documents.    SPECIAL INSTRUCTIONS: Call for a temp >100.4, heavy vaginal bleeding, foul smelling discharge, or if your incision oozes blood, pus, or fluid.  No driving x 2 weeks or while on narcotics, no lifting >20 pounds x 4 weeks, and pelvic rest x 6 weeks.    DIET: To avoid nausea, slowly advance diet as tolerated, avoiding spicy or greasy foods for the first day.  Add more substantial food to your diet according to your physician's instructions.  INCREASE FLUIDS AND FIBER TO AVOID CONSTIPATION.    SURGICAL DRESSING/BATHING: You may shower starting tomorrow. No tub baths, hot tubs, or swimming till your doctor okays it.    FOLLOW-UP APPOINTMENT:  A follow-up appointment should be arranged with your doctor in 1 week; call to schedule.    You should CALL YOUR PHYSICIAN if you develop:  Fever greater than 100.4 degrees F.  Pain not relieved by medication, or persistent nausea or vomiting.  Excessive bleeding (blood soaking through dressing) or unexpected drainage from the wound.  Extreme redness or swelling around the incision site, drainage of pus or foul smelling drainage.  Inability to urinate or empty your bladder within 8 hours.  Problems with breathing or chest pain.    You should call 911 if you develop problems with breathing or chest pain.  If you are unable to contact your doctor or surgical center, you should go to the nearest emergency room or urgent care center.  Physician's  telephone #: [phone].    If any questions arise, call your doctor.  If your doctor is not available, please feel free to call the Surgical Center at [phone].  The Center is open Monday through Friday from 7AM to 7PM.  You can also call the HEALTH HOTLINE open 24 hours/day, 7 days/week and speak to a nurse at [phone], or toll free at [phone].    A registered nurse may call you a few days after your surgery to see how you are doing after your procedure.    MEDICATIONS: Resume taking daily medication.  Take prescribed pain medication with food.  If no medication is prescribed, you may take non-aspirin pain medication if needed.  PAIN MEDICATION CAN BE VERY CONSTIPATING.  Take a stool softener or laxative such as senokot, pericolace, or milk of magnesia if needed.    Prescription given for Percocet, Ibuprofen, Zofran, and Senokot.  Last pain medication (similar to Percocet) given at 3:00 PM.    If your physician has prescribed pain medication that includes Acetaminophen (Tylenol), do not take additional Acetaminophen (Tylenol) while taking the prescribed medication.    Depression / Suicide Risk    As you are discharged from this [facility], it is important to learn how to keep safe from harming yourself.    Recognize the warning signs:  · Abrupt changes in personality, positive or negative- including increase in energy   · Giving away possessions  · Change in eating patterns- significant weight changes-  positive or negative  · Change in sleeping patterns- unable to sleep or sleeping all the time   · Unwillingness or inability to communicate  · Depression  · Unusual sadness, discouragement and loneliness  · Talk of wanting to die  · Neglect of personal appearance   · Rebelliousness- reckless behavior  · Withdrawal from people/activities they love  · Confusion- inability to concentrate     If you or a loved one observes any of these behaviors or has concerns about self-harm, here's what  you can do:  · Talk about it- your feelings and reasons for harming yourself  · Remove any means that you might use to hurt yourself (examples: pills, rope, extension cords, firearm)  · Get professional help from the community (Mental Health, Substance Abuse, psychological counseling)  · Do not be alone:Call your Safe Contact- someone whom you trust who will be there for you.  · Call your local CRISIS HOTLINE 207-5304 or 512-210-7705  · Call your local Children's Mobile Crisis Response Team [place] (558) 922-8085 or www.[employer]  · Call the toll free National Suicide Prevention Hotlines   · National Suicide Prevention Lifeline 926-404-MQAN (8098)  · National Hope Line Network 800-SUICIDE (465-4164)

## 2018-04-18 NOTE — OP REPORT
DATE OF SERVICE:  04/17/2018    PREOPERATIVE DIAGNOSES:  1.  Chronic pelvic pain.  2.  Dysmenorrhea.  3.  Menorrhagia.  4.  Possible endometrial polyp or submucosal fibroid.  5.  Bladder pain and urinary frequency.  6.  Desires conservative surgical management.    POSTOPERATIVE DIAGNOSES:  1.  Chronic pelvic pain.  2.  Dysmenorrhea.  3.  Menorrhagia.  4.  Possible endometrial polyp or submucosal fibroid.  5.  Bladder pain and urinary frequency.  6.  Desires conservative surgical management.  7.  Pelvic adhesive disease.  8.  Endometriosis.  9.  Interstitial cystitis.  10.  Left ovarian cyst.    PROCEDURE:  Examination under anesthesia, exploratory laparoscopy, washings,   peritoneal biopsies, incision and drainage of right hemorrhagic ovarian cyst,   lysis of adhesions, biopsy of endometriosis implants, placement of Lorie,   diagnostic and operative hysteroscopy, endometrial curettage, cystoscopy with   bladder distension.    SURGEON:  Diamond Atkins MD    ANESTHESIOLOGIST:  Warren Moncada MD    ANESTHESIA:  General endotracheal tube and local anesthetic.    SPECIMEN:  Washings, peritoneal biopsies, ovarian biopsy and endometrial   curettings.    ESTIMATED BLOOD LOSS:  Less than 25 mL.    FINDINGS:  On examination under anesthesia, her uterus is anteverted, mobile,   and 6 weeks size.  There are no adnexal masses palpated.  Her perineum appears   without obvious masses or lesions.  Area of previous condyloma excision   appears well healed without obvious dehiscence or bleeding noted.    Laparoscopic findings, normal bilateral tubes.  Normal right ovary.  Left   ovary with a 1 cm hemorrhagic bloody fluid-filled cyst.  Punctate erythematous   endometriosis implants noted on the ovarian cortex near the hemorrhagic cyst.    Bilateral ureters are identified.  Pelvic peritoneum, both the anterior and   posterior cul-de-sac is notable for dilated and erythematous blood vessels.    Areas consistent with  endometriosis noted, but no obvious biopsiable powder   burn implants noted.  There is a bluish hue noted near the left IP ligament   ureter which represents likely endometriosis implants, but an area that is too   dangerous to excise or fulgurate.  The left pelvic sidewall sigmoid colon   adhesions are noted, possibly related to endometriosis or possibly physiologic   which were partially excised, but also near the blood supply to the ovary and   adherent to the colon and thus safe to excise extensively.  Normal appearing   upper abdomen.    Cystoscopic findings, bilateral ureteral orifices seen and efflux of urine   noted.  Bladder mucosa appears hyperemic with dilated and erythematous blood   vessels.  No evidence of bladder masses or lesions.    Hysteroscopic findings, bilateral tubal ostia identified.  No obvious   endometrial polyps or submucosal fibroids or other masses or lesions noted.    Endometrium appears plush.    COMPLICATIONS:  None apparent.    INDICATIONS FOR THE PROCEDURE:  The patient is a 28-year-old nulligravid   sexually active woman in a same sex relationship, with the last menstrual   period of 4/2/18, who has a long history of dysmenorrhea, abnormal uterine   bleeding as well as bladder pain and urinary frequency who desires   conservative surgical management.    DETAILS OF THE PROCEDURE:  The patient was taken to the operating room where   she underwent general endotracheal tube anesthesia.  She was then placed in   the dorsal lithotomy position in the [facility].  An examination   under anesthesia was performed and the findings are noted as above.  The   patient was then prepped and draped in the dorsal lithotomy position.  A Fox   catheter was placed in her urinary bladder.    A medium Graves speculum was inserted into the vagina.  The cervix was   visualized.  The anterior lip of the cervix was grasped with a long Allis and   the endocervix was dilated to allow a #6 Sheri  manipulator to be placed within   the uterine cavity.  The Sheri manipulator was placed without difficulty.  The   Allis clamp and speculum were removed.    Attention was turned to the laparoscopic portion of the procedure, where a 10   mm infraumbilical skin incision was made with a scalpel after the instillation   of 0.25% Marcaine with epinephrine.  The incision was carried down to the   level of the fascia.  The fascia was grasped with Kocher clamps, elevated and   incised with Curiel scissors.  The incision was extended laterally with Curiel   scissors.  The S retractors were placed within the incision.  Either side of   the fascial incision was sutured with 0 Vicryl stay sutures.  The peritoneum   was identified and grasped with the Pean clamp.  The peritoneum was incised   with the Metzenbaum scissors.  The incision was extended laterally with the   Metzenbaum scissors.  The S retractors were placed within this incision and   intra-abdominal entry was confirmed.  The Cholo trocar was inserted under   direct visualization.  The CO2 gas was connected and the opening pressure was   8 mmHg.  The patient was placed in Trendelenburg.    The patient's pelvis and upper abdomen were explored and the findings are   noted as above.    A second trocar site was identified 3 fingerbreadths above the pubic   symphysis.  A 5 mm incision was made with a scalpel after the instillation of   0.25% Marcaine with epinephrine.  The 5 mm trocar was inserted under direct   visualization.  The uterus was elevated using the Sheri manipulator.  The   Prestige clamp was used to sweep the bowel into the upper abdomen and the   bilateral ureters were identified.  The bilateral fallopian tubes were   identified and followed out to the fimbriated ends.  The bilateral ovaries   were identified.  A left ovarian cyst was identified.  Pelvic washings were   performed using the suction  and these were sent to pathology.  The   left ovary was  grasped with the Prestige clamp, elevated and a left   hemorrhagic cyst was cauterized with the electrocautery.  This was then found   to be hemostatic.    The pelvis was thoroughly explored and all areas of endometriosis were   identified.  The endometriosis in general was diffuse and covered areas that   were unsafe for excision or fulguration.  There were adhesions on the left   pelvic sidewall that were bringing the left sigmoid colon up towards the left   adnexa.  These were cauterized with the electrocautery and bluntly dissected.    However, they were quite near the actual mucosa of the bowel and thus I did   not feel comfortable doing extensive adhesiolysis for fear of injuring the   mucosa of the bowel.    There were areas of the left ovarian cyst again identified and there was an   additional small area of bleeding noted on it.  The cyst was grasped and   excised and sent to pathology.    The pelvis was thoroughly irrigated with sterile water and Lorie was placed   on all of the peritoneal surfaces.    The trocars were removed under direct visualization.  The CO2 gas was released   from the patient's abdomen.  The fascia was reapproximated with 0 Vicryl.    The subcutaneous tissues were reapproximated with 0 Vicryl and the skin was   closed with 4-0 Vicryl.  The infraumbilical incision was coated in Dermabond.    The suprapubic incision was reapproximated with 4-0 Vicryl and coated with   Dermabond.    Attention was turned to the cystoscopic portion of the procedure.  The Fox   catheter was removed.  The 30-degree cystoscope was inserted and the bladder   was distended with 300 mL of saline.  These findings are noted as above.  The   Fox catheter was replaced.  The bladder was drained.    Attention was turned to the hysteroscopic portion of the procedure.  The Sheri   manipulator was removed from the patient's uterine cavity, the speculum was   inserted.  The cervix was grasped with a single tooth  tenaculum.  The   endocervix was dilated to allow the MyoSure camera to be inserted.  The   MyoSure camera was inserted under direct visualization.  The uterine cavity   was examined and the findings are noted as above.  The hysteroscope was   removed.  A gentle uterine curettage was performed and the specimen was sent   to pathology.  The tenaculum was removed and tenaculum site bleeding was made   hemostatic with silver nitrate and Monsel solution.  The speculum was removed.    The Fox catheter was removed.  The patient was taken out of dorsal   lithotomy position.  She was then extubated and taken to the PACU in stable   condition.  Sponge, lap, needle counts were correct x2.       ____________________________________     SARAHY ADAMES MD    HTA / NTS    DD:  04/17/2018 17:10:07  DT:  04/17/2018 17:57:02    D#:  [id number]  Job#:  [id number]

## 2018-04-18 NOTE — OR NURSING
1612 Arrives per cart to PACU, is sleepy. Monitors applied.  Handoff report received from anesthesia and OR nursing personnel.     1630 Restless, states repeatedly that needs to void. Given female urinal and reminded patient urethral catheter was in place during procedure and was removed at the end of the case. Spouse in to see.    1700 C/o some uterine cramping, rates at level 6-7. Medicated as ordered po.    1730 Rates pain at level 2. Up to bathroom with 1 assist, voided, dressing for discharge.    1750 Discharge instructions reviewed with patient and her spouse. They verbalized understanding. Patient discharged per wheelchair with belongings and discharge instructions. Spouse in accompaniment.          
Interspace located. Using sterile technique, the area was anesthetized. The needle was slowly inserted and advanced at the appropriate angle into the subarachnoid space to allow CSF return. Stylet withdrawn. Cerebral Spinal Fluid was evaluated and any required specimens were drawn. Stylet replaced, needle removed, skin cleaned, sterile dressing applied. Patient placed in supine position.

## 2018-07-11 ENCOUNTER — HOSPITAL ENCOUNTER (INPATIENT)
Facility: MEDICAL CENTER | Age: 28
LOS: 1 days | DRG: 743 | End: 2018-07-11
Attending: OBSTETRICS & GYNECOLOGY | Admitting: OBSTETRICS & GYNECOLOGY
Payer: MEDICAID

## 2018-07-11 VITALS
SYSTOLIC BLOOD PRESSURE: 104 MMHG | DIASTOLIC BLOOD PRESSURE: 56 MMHG | BODY MASS INDEX: 24.52 KG/M2 | RESPIRATION RATE: 16 BRPM | OXYGEN SATURATION: 100 % | HEART RATE: 75 BPM | WEIGHT: 156.53 LBS | TEMPERATURE: 98.2 F

## 2018-07-11 DIAGNOSIS — G89.18 PAIN FOLLOWING SURGERY OR PROCEDURE: ICD-10-CM

## 2018-07-11 LAB
ANION GAP SERPL CALC-SCNC: 11 MMOL/L (ref 0–11.9)
BASOPHILS # BLD AUTO: 0.6 % (ref 0–1.8)
BASOPHILS # BLD: 0.06 K/UL (ref 0–0.12)
BUN SERPL-MCNC: 8 MG/DL (ref 8–22)
CALCIUM SERPL-MCNC: 9.4 MG/DL (ref 8.5–10.5)
CHLORIDE SERPL-SCNC: 106 MMOL/L (ref 96–112)
CO2 SERPL-SCNC: 22 MMOL/L (ref 20–33)
CREAT SERPL-MCNC: 0.7 MG/DL (ref 0.5–1.4)
EOSINOPHIL # BLD AUTO: 0.14 K/UL (ref 0–0.51)
EOSINOPHIL NFR BLD: 1.5 % (ref 0–6.9)
ERYTHROCYTE [DISTWIDTH] IN BLOOD BY AUTOMATED COUNT: 43.2 FL (ref 35.9–50)
GLUCOSE SERPL-MCNC: 92 MG/DL (ref 65–99)
HCG SERPL QL: NEGATIVE
HCT VFR BLD AUTO: 40.7 % (ref 37–47)
HGB BLD-MCNC: 13.3 G/DL (ref 12–16)
IMM GRANULOCYTES # BLD AUTO: 0.03 K/UL (ref 0–0.11)
IMM GRANULOCYTES NFR BLD AUTO: 0.3 % (ref 0–0.9)
LYMPHOCYTES # BLD AUTO: 2.5 K/UL (ref 1–4.8)
LYMPHOCYTES NFR BLD: 26.5 % (ref 22–41)
MCH RBC QN AUTO: 29.5 PG (ref 27–33)
MCHC RBC AUTO-ENTMCNC: 32.7 G/DL (ref 33.6–35)
MCV RBC AUTO: 90.2 FL (ref 81.4–97.8)
MONOCYTES # BLD AUTO: 0.82 K/UL (ref 0–0.85)
MONOCYTES NFR BLD AUTO: 8.7 % (ref 0–13.4)
NEUTROPHILS # BLD AUTO: 5.87 K/UL (ref 2–7.15)
NEUTROPHILS NFR BLD: 62.4 % (ref 44–72)
NRBC # BLD AUTO: 0 K/UL
NRBC BLD-RTO: 0 /100 WBC
PLATELET # BLD AUTO: 202 K/UL (ref 164–446)
PMV BLD AUTO: 12.2 FL (ref 9–12.9)
POTASSIUM SERPL-SCNC: 3.4 MMOL/L (ref 3.6–5.5)
RBC # BLD AUTO: 4.51 M/UL (ref 4.2–5.4)
SODIUM SERPL-SCNC: 139 MMOL/L (ref 135–145)
WBC # BLD AUTO: 9.4 K/UL (ref 4.8–10.8)

## 2018-07-11 PROCEDURE — 160041 HCHG SURGERY MINUTES - EA ADDL 1 MIN LEVEL 4: Performed by: OBSTETRICS & GYNECOLOGY

## 2018-07-11 PROCEDURE — 306637 HCHG MISC ORTHO ITEM RC 0274

## 2018-07-11 PROCEDURE — 160035 HCHG PACU - 1ST 60 MINS PHASE I: Performed by: OBSTETRICS & GYNECOLOGY

## 2018-07-11 PROCEDURE — 501838 HCHG SUTURE GENERAL: Performed by: OBSTETRICS & GYNECOLOGY

## 2018-07-11 PROCEDURE — 501330 HCHG SET, CYSTO IRRIG TUBING: Performed by: OBSTETRICS & GYNECOLOGY

## 2018-07-11 PROCEDURE — A6402 STERILE GAUZE <= 16 SQ IN: HCPCS | Performed by: OBSTETRICS & GYNECOLOGY

## 2018-07-11 PROCEDURE — 700101 HCHG RX REV CODE 250

## 2018-07-11 PROCEDURE — 502648 HCHG APPLICATOR, EVICEL: Performed by: OBSTETRICS & GYNECOLOGY

## 2018-07-11 PROCEDURE — 0UT7FZZ RESECTION OF BILATERAL FALLOPIAN TUBES, VIA NATURAL OR ARTIFICIAL OPENING WITH PERCUTANEOUS ENDOSCOPIC ASSISTANCE: ICD-10-PCS | Performed by: OBSTETRICS & GYNECOLOGY

## 2018-07-11 PROCEDURE — 0WJG4ZZ INSPECTION OF PERITONEAL CAVITY, PERCUTANEOUS ENDOSCOPIC APPROACH: ICD-10-PCS | Performed by: OBSTETRICS & GYNECOLOGY

## 2018-07-11 PROCEDURE — A9270 NON-COVERED ITEM OR SERVICE: HCPCS

## 2018-07-11 PROCEDURE — 160029 HCHG SURGERY MINUTES - 1ST 30 MINS LEVEL 4: Performed by: OBSTETRICS & GYNECOLOGY

## 2018-07-11 PROCEDURE — 160048 HCHG OR STATISTICAL LEVEL 1-5: Performed by: OBSTETRICS & GYNECOLOGY

## 2018-07-11 PROCEDURE — 0UT9FZZ RESECTION OF UTERUS, VIA NATURAL OR ARTIFICIAL OPENING WITH PERCUTANEOUS ENDOSCOPIC ASSISTANCE: ICD-10-PCS | Performed by: OBSTETRICS & GYNECOLOGY

## 2018-07-11 PROCEDURE — 80048 BASIC METABOLIC PNL TOTAL CA: CPT

## 2018-07-11 PROCEDURE — 84703 CHORIONIC GONADOTROPIN ASSAY: CPT

## 2018-07-11 PROCEDURE — 160002 HCHG RECOVERY MINUTES (STAT): Performed by: OBSTETRICS & GYNECOLOGY

## 2018-07-11 PROCEDURE — 85025 COMPLETE CBC W/AUTO DIFF WBC: CPT

## 2018-07-11 PROCEDURE — 500800 HCHG LAPAROSCOPIC J/L HOOK: Performed by: OBSTETRICS & GYNECOLOGY

## 2018-07-11 PROCEDURE — 500886 HCHG PACK, LAPAROSCOPY: Performed by: OBSTETRICS & GYNECOLOGY

## 2018-07-11 PROCEDURE — 0UT1FZZ RESECTION OF LEFT OVARY, VIA NATURAL OR ARTIFICIAL OPENING WITH PERCUTANEOUS ENDOSCOPIC ASSISTANCE: ICD-10-PCS | Performed by: OBSTETRICS & GYNECOLOGY

## 2018-07-11 PROCEDURE — 700104 HCHG RX REV CODE 254

## 2018-07-11 PROCEDURE — 700111 HCHG RX REV CODE 636 W/ 250 OVERRIDE (IP)

## 2018-07-11 PROCEDURE — 500002 HCHG ADHESIVE, DERMABOND: Performed by: OBSTETRICS & GYNECOLOGY

## 2018-07-11 PROCEDURE — 88307 TISSUE EXAM BY PATHOLOGIST: CPT

## 2018-07-11 PROCEDURE — A4338 INDWELLING CATHETER LATEX: HCPCS | Performed by: OBSTETRICS & GYNECOLOGY

## 2018-07-11 PROCEDURE — 501582 HCHG TROCAR, THRD BLADED: Performed by: OBSTETRICS & GYNECOLOGY

## 2018-07-11 PROCEDURE — 502703 HCHG DEVICE, LIGASURE V SEALER: Performed by: OBSTETRICS & GYNECOLOGY

## 2018-07-11 PROCEDURE — 700102 HCHG RX REV CODE 250 W/ 637 OVERRIDE(OP)

## 2018-07-11 PROCEDURE — 160036 HCHG PACU - EA ADDL 30 MINS PHASE I: Performed by: OBSTETRICS & GYNECOLOGY

## 2018-07-11 PROCEDURE — 500512 HCHG ENDO PEANUT: Performed by: OBSTETRICS & GYNECOLOGY

## 2018-07-11 PROCEDURE — 502587 HCHG PACK, D&C: Performed by: OBSTETRICS & GYNECOLOGY

## 2018-07-11 PROCEDURE — 160009 HCHG ANES TIME/MIN: Performed by: OBSTETRICS & GYNECOLOGY

## 2018-07-11 RX ORDER — BUPIVACAINE HYDROCHLORIDE 2.5 MG/ML
INJECTION, SOLUTION EPIDURAL; INFILTRATION; INTRACAUDAL
Status: DISCONTINUED
Start: 2018-07-11 | End: 2018-07-11 | Stop reason: HOSPADM

## 2018-07-11 RX ORDER — EPINEPHRINE 1 MG/ML
INJECTION INTRAMUSCULAR; INTRAVENOUS; SUBCUTANEOUS
Status: DISCONTINUED
Start: 2018-07-11 | End: 2018-07-11 | Stop reason: HOSPADM

## 2018-07-11 RX ORDER — HALOPERIDOL 5 MG/ML
INJECTION INTRAMUSCULAR
Status: COMPLETED
Start: 2018-07-11 | End: 2018-07-11

## 2018-07-11 RX ORDER — MIDAZOLAM HYDROCHLORIDE 1 MG/ML
INJECTION INTRAMUSCULAR; INTRAVENOUS
Status: DISCONTINUED
Start: 2018-07-11 | End: 2018-07-11 | Stop reason: HOSPADM

## 2018-07-11 RX ORDER — OXYCODONE HYDROCHLORIDE AND ACETAMINOPHEN 5; 325 MG/1; MG/1
1-2 TABLET ORAL EVERY 4 HOURS PRN
Qty: 30 TAB | Refills: 0 | Status: SHIPPED | OUTPATIENT
Start: 2018-07-11 | End: 2018-07-18

## 2018-07-11 RX ORDER — SODIUM CHLORIDE, SODIUM LACTATE, POTASSIUM CHLORIDE, CALCIUM CHLORIDE 600; 310; 30; 20 MG/100ML; MG/100ML; MG/100ML; MG/100ML
INJECTION, SOLUTION INTRAVENOUS CONTINUOUS
Status: DISCONTINUED | OUTPATIENT
Start: 2018-07-11 | End: 2018-07-11 | Stop reason: HOSPADM

## 2018-07-11 RX ORDER — BUPIVACAINE HYDROCHLORIDE AND EPINEPHRINE 2.5; 5 MG/ML; UG/ML
INJECTION, SOLUTION EPIDURAL; INFILTRATION; INTRACAUDAL; PERINEURAL
Status: DISCONTINUED | OUTPATIENT
Start: 2018-07-11 | End: 2018-07-11 | Stop reason: HOSPADM

## 2018-07-11 RX ORDER — OXYCODONE HCL 5 MG/5 ML
SOLUTION, ORAL ORAL
Status: COMPLETED
Start: 2018-07-11 | End: 2018-07-11

## 2018-07-11 RX ADMIN — FENTANYL CITRATE 25 MCG: 50 INJECTION, SOLUTION INTRAMUSCULAR; INTRAVENOUS at 10:17

## 2018-07-11 RX ADMIN — HALOPERIDOL LACTATE 1 MG: 5 INJECTION, SOLUTION INTRAMUSCULAR at 10:45

## 2018-07-11 RX ADMIN — SODIUM CHLORIDE, SODIUM LACTATE, POTASSIUM CHLORIDE, CALCIUM CHLORIDE: 600; 310; 30; 20 INJECTION, SOLUTION INTRAVENOUS at 06:30

## 2018-07-11 RX ADMIN — OXYCODONE HYDROCHLORIDE 10 MG: 5 SOLUTION ORAL at 10:15

## 2018-07-11 RX ADMIN — FENTANYL CITRATE 75 MCG: 50 INJECTION, SOLUTION INTRAMUSCULAR; INTRAVENOUS at 10:40

## 2018-07-11 RX ADMIN — FENTANYL CITRATE 25 MCG: 50 INJECTION, SOLUTION INTRAMUSCULAR; INTRAVENOUS at 11:44

## 2018-07-11 RX ADMIN — FENTANYL CITRATE 75 MCG: 50 INJECTION, SOLUTION INTRAMUSCULAR; INTRAVENOUS at 11:05

## 2018-07-11 ASSESSMENT — PAIN SCALES - GENERAL
PAINLEVEL_OUTOF10: 2
PAINLEVEL_OUTOF10: 5
PAINLEVEL_OUTOF10: 2
PAINLEVEL_OUTOF10: ASSUMED PAIN PRESENT
PAINLEVEL_OUTOF10: 2

## 2018-07-11 NOTE — H&P
DATE OF OPERATION:  07/11/2018    PREOPERATIVE DIAGNOSES:  1.  Chronic pelvic pain.  2.  Dysmenorrhea.  3.  Abnormal uterine bleeding.  4.  Endometriosis.  5.  Interstitial cystitis.  6.  Desires definitive surgical management.  7.  Endometrial polyp.    PLANNED PROCEDURE:  Examination under anesthesia, exploratory laparoscopy,   lysis of adhesions, total laparoscopic hysterectomy, bilateral salpingectomy,   left oophorectomy and cystoscopy.    HISTORY OF PRESENT ILLNESS:  The patient is a 28-year-old nulligravid and   sexually active woman in a same sex relationship with a last menstrual period   of 07/03/2018 who has a long history of dysmenorrhea, abnormal uterine   bleeding and bladder pain who underwent conservative surgical management on   04/17/2018 and was diagnosed with endometriosis and interstitial cystitis.    Since prior and since that surgery, she has continued to have abnormal   bleeding and pelvic pain.  She cannot tolerate hormonal therapy and desires   definitive surgical management.    The risks, benefits and alternatives of an examination under anesthesia,   exploratory laparoscopy, lysis of adhesions, total laparoscopic hysterectomy,   bilateral salpingectomy, and cystoscopy.    PAST SURGICAL HISTORY:  On 04/17/2018, she underwent an examination under   anesthesia, exploratory laparoscopy, washings, peritoneal biopsies, incision   and drainage of right hemorrhagic ovarian cyst, lysis of adhesions, biopsy of   endometriosis implants, placement of Lorie, diagnostic and operative   hysteroscopy, endometrial curettage, cystoscopy with bladder distention.    The pathology on the endometrial curettings demonstrated benign proliferative   type endometrium with focal polyp and stromal hemorrhage and the endocervical   curettage demonstrated benign proliferative type endometrium with 2 polyp   fragments.    PAST MEDICAL HISTORY:  Endometriosis, interstitial cystitis, chronic pelvic   pain, low back  pain.    MEDICATIONS:  Cyclobenzaprine 5 mg p.o. t.i.d., Uribel.    SOCIAL HISTORY:  She is partnered.  She denies alcohol, tobacco, or drugs of   abuse.    ALLERGIES:  No known drug allergies.    PHYSICAL EXAMINATION:  VITAL SIGNS:  Stable.  She is afebrile.  GENERAL:  Alert, awake and oriented x3 in no acute distress.  LUNGS:  Clear to auscultation bilaterally.  HEART:  Regular rate and rhythm.  No murmurs, rubs or gallops.  S1, S2 intact.  ABDOMEN:  Soft and nontender with well-healed previous laparoscopy incisions.  GENITOURINARY:  Deferred.  EXTREMITIES:  No clubbing, cyanosis or edema.    LABORATORY DATA:  Preoperative laboratory studies are pending.    ASSESSMENT AND PLAN:  A 28-year-old nulligravid sexually active woman in a   same sex relationship with a last menstrual period of 07/03/2018 with   endometriosis and interstitial cystitis who has a long history of chronic   pelvic pain and low back pain who also has endometrial and endocervical polyps   who desires definitive surgical management who is intolerant of medical   management.  We will proceed to the operating room for an examination under   anesthesia, exploratory laparoscopy, total laparoscopic hysterectomy,   bilateral salpingectomy, left oophorectomy and cystoscopy.  Risks, benefits   and alternatives were discussed with the patient and she agrees to proceed.       ____________________________________     MD GARTH PIERSON / NTS    DD:  07/10/2018 17:44:50  DT:  07/10/2018 18:05:56    D#:  [id number]  Job#:  [id number]

## 2018-07-11 NOTE — OR SURGEON
Immediate Post OP Note    PreOp Diagnosis:     1.  Chronic pelvic pain.    2.  Dysmenorrhea.    3.  Abnormal uterine bleeding.  4.  Endometriosis.  5.  Interstitial cystitis.  6.  Desires definitive surgical management.  7.  Endometrial polyp.     PostOp Diagnosis: As above.    Procedure(s):  PELVISCOPY- EUS - Wound Class: Clean Contaminated  LYSIS ADHESIONS GYN - Wound Class: Clean Contaminated  HYSTERECTOMY LAPAROSCOPY - Wound Class: Clean Contaminated  SALPINGECTOMY - Wound Class: Clean Contaminated  OOPHORECTOMY - Wound Class: Clean Contaminated  CYSTOSCOPY - Wound Class: Clean Contaminated    Surgeon(s):  THERESE Urias M.D.    Anesthesiologist/Type of Anesthesia:  Anesthesiologist: Lucio Garcia M.D./General and local anesthetic.    Surgical Staff:  Circulator: Alaina Medina R.N.  Relief Circulator: Rissa Nicholas R.N.  Relief Scrub: Freddie Cole  Scrub Person: Doug Huddlestona    Specimens removed if any:  1.  Uterus and cervix.  2.  Left ovary.  3.  Bilateral fallopian tubes.    Estimated Blood Loss: 100 cc    Findings: EUA: uterus is AV mobile and 6 weeks size.  No adnexal masses palpated.  Cervix is small without masses.  Laparoscopic findings: normal bilateral tubes and ovaries.  Uterus without masses or lesions.  No evidence of endometriosis implants visible.  Normal upper abdomen.  Liver appears to have fatty deposits    Cystoscopic finding: bilateral ureters identified with efflux of blue urine through both ureters.  Normal bladder dome and trigone.      Complications: none apparent.        7/11/2018 12:50 PM Diamond Atkins M.D.

## 2018-07-11 NOTE — PROGRESS NOTES
1234 - pt awake and alert - no c/o nausea, pain 2/10 - lower abd. (See downtime charting for further notes and vital signs)D/C instructions reviewed with pt and wife by Renita CERNA - all questions and concerns addressed  1250 - oob to bathroom  1304 pt discharged via w/c  to private car - acc by family and volunteer

## 2018-07-11 NOTE — DISCHARGE INSTRUCTIONS
ACTIVITY: Rest and take it easy for the first 24 hours.  A responsible adult is recommended to remain with you during that time.  It is normal to feel sleepy.  We encourage you to not do anything that requires balance, judgment or coordination.    MILD FLU-LIKE SYMPTOMS ARE NORMAL. YOU MAY EXPERIENCE GENERALIZED MUSCLE ACHES, THROAT IRRITATION, HEADACHE AND/OR SOME NAUSEA.    FOR 24 HOURS DO NOT:  Drive, operate machinery or run household appliances.  Drink beer or alcoholic beverages.   Make important decisions or sign legal documents.    SPECIAL INSTRUCTIONS: *SEE HYSTERECTOMY INSTRUCTION SHEET*    DIET: To avoid nausea, slowly advance diet as tolerated, avoiding spicy or greasy foods for the first day.  Add more substantial food to your diet according to your physician's instructions.  Babies can be fed formula or breast milk as soon as they are hungry.  INCREASE FLUIDS AND FIBER TO AVOID CONSTIPATION.    SURGICAL DRESSING/BATHING: *MAY SHOWER TOMORROW.  NO TUB BATHS, HOT TUBS OR SWIMMING FOR 6 WEEKS **    FOLLOW-UP APPOINTMENT:  A follow-up appointment should be arranged with your doctor in *FOLLOW UP WITH DR. DORANTES**; call to schedule.    You should CALL YOUR PHYSICIAN if you develop:  Fever greater than 101 degrees F.  Pain not relieved by medication, or persistent nausea or vomiting.  Excessive bleeding (blood soaking through dressing) or unexpected drainage from the wound.  Extreme redness or swelling around the incision site, drainage of pus or foul smelling drainage.  Inability to urinate or empty your bladder within 8 hours.  Problems with breathing or chest pain.    You should call 911 if you develop problems with breathing or chest pain.  If you are unable to contact your doctor or surgical center, you should go to the nearest emergency room or urgent care center.  Physician's telephone #: *DR. DORANTES [phone]**    If any questions arise, call your doctor.  If your doctor is not available, please  feel free to call the Surgical Center at [phone].  The Center is open Monday through Friday from 7AM to 7PM.  You can also call the HEALTH HOTLINE open 24 hours/day, 7 days/week and speak to a nurse at [phone], or toll free at [phone].    A registered nurse may call you a few days after your surgery to see how you are doing after your procedure.    MEDICATIONS: Resume taking daily medication.  Take prescribed pain medication with food.  If no medication is prescribed, you may take non-aspirin pain medication if needed.  PAIN MEDICATION CAN BE VERY CONSTIPATING.  Take a stool softener or laxative such as senokot, pericolace, or milk of magnesia if needed.    Prescription given for **PERCOCET*.  Last pain medication given at *10: am - next dose due at 2 pm  If your physician has prescribed pain medication that includes Acetaminophen (Tylenol), do not take additional Acetaminophen (Tylenol) while taking the prescribed medication.    Depression / Suicide Risk    As you are discharged from this [facility] facility, it is important to learn how to keep safe from harming yourself.    Recognize the warning signs:  · Abrupt changes in personality, positive or negative- including increase in energy   · Giving away possessions  · Change in eating patterns- significant weight changes-  positive or negative  · Change in sleeping patterns- unable to sleep or sleeping all the time   · Unwillingness or inability to communicate  · Depression  · Unusual sadness, discouragement and loneliness  · Talk of wanting to die  · Neglect of personal appearance   · Rebelliousness- reckless behavior  · Withdrawal from people/activities they love  · Confusion- inability to concentrate     If you or a loved one observes any of these behaviors or has concerns about self-harm, here's what you can do:  · Talk about it- your feelings and reasons for harming yourself  · Remove any means that you might use to hurt yourself  (examples: pills, rope, extension cords, firearm)  · Get professional help from the community (Mental Health, Substance Abuse, psychological counseling)  · Do not be alone:Call your Safe Contact- someone whom you trust who will be there for you.  · Call your local CRISIS HOTLINE 884-2580 or 835-750-8753  · Call your local Children's Mobile Crisis Response Team [place] (862) 542-6079 or www.[employer]  · Call the toll free National Suicide Prevention Hotlines   · National Suicide Prevention Lifeline 281-326-DSGD (0765)  · National Hope Line Network 800-SUICIDE (635-5973)

## 2018-07-12 NOTE — OP REPORT
DATE OF SERVICE:  07/11/2018    PREOPERATIVE DIAGNOSES:  1.  Chronic pelvic pain.  2.  Dysmenorrhea.  3.  Abnormal uterine bleeding.  4.  Endometrial polyp.  5.  Endometriosis.  6.  Interstitial cystitis.  7.  Desires definitive surgical management.    POSTOPERATIVE DIAGNOSES:  1.  Chronic pelvic pain.  2.  Dysmenorrhea.  3.  Abnormal uterine bleeding.  4.  Endometrial polyp.  5.  Endometriosis.  6.  Interstitial cystitis.  7.  Desires definitive surgical management.    PROCEDURE PERFORMED:  Examination under anesthesia, exploratory laparoscopy,   total laparoscopic hysterectomy, bilateral salpingectomy, left oophorectomy,   cystoscopy, and lysis of adhesions.    SURGEON:  Diamond Atkins MD    ASSISTANT:  Jesus San MD    ANESTHESIOLOGIST:  Lucio Garcia MD    ANESTHESIA:  General endotracheal tube anesthesia and local anesthetic.    SPECIMEN:  1.  Uterus and cervix.  2.  Left ovary.  3.  Bilateral fallopian tubes.    ESTIMATED BLOOD LOSS:  100 mL    FINDINGS:  On examination under anesthesia, her uterus is anteverted, mobile,   and 6 weeks size.  There are no adnexal masses palpated.  Her cervix is small   without masses or lesions.  There was a small amount of adhesions from the   left sigmoid colon to the left tube and ovary.    LAPAROSCOPIC FINDINGS:  Normal bilateral tubes and ovaries.  Uterus without   masses or lesions.  There is no evidence of recurrent endometriosis implants   visible at this time.  Normal upper abdomen.  The liver does appear to have   some fatty deposits and the gallbladder appeared slightly dilated.    CYSTOSCOPIC FINDINGS:  Her bilateral ureters are identified with the efflux of   blue urine through both ureteral orifices.  Normal bladder dome and trigone.    Normal bladder mucosa.    COMPLICATIONS:  None apparent.    INDICATIONS FOR THE PROCEDURE:  The patient is a 28-year-old nulligravid and   sexually active woman in the same-sex relationship with the last menstrual    period of 07/03/2018 who has a long history of dysmenorrhea, abnormal uterine   bleeding, and bladder pain who underwent conservative surgery on 04/17/2018   and who was diagnosed with endometriosis and interstitial cystitis and an   endometrial polyp.  Since prior to and since that surgery, she has had   abnormal uterine bleeding and pelvic pain.  The patient could not tolerate   hormonal therapy and desires definitive surgical management.    DETAILS OF THE PROCEDURE:  The patient was taken to the operating room where   she underwent general endotracheal tube anesthesia.  She was then placed in   the dorsal lithotomy position in the [facility].  An examination   under anesthesia was performed and the findings are noted as above.  The   patient was then prepped and draped in the dorsal lithotomy position.  A Fox   catheter was placed in her urinary bladder.  A medium Graves speculum was   inserted into the vagina.  The cervix was visualized.  The anterior lip of the   cervix was grasped with an Allis clamp.  The endocervix was dilated to allow   the VCare uterine manipulator to be placed within the uterine cavity.  The   VCare manipulator was placed without difficulty.  The cervical cup was placed   around the cervix and the vaginal cup was placed in the usual fashion.    The tenaculum and the Allis clamp had been removed and the speculum was   removed.    Attention was turned to the laparoscopic portion of the procedure.  A 10 mm   infraumbilical skin incision was made with the scalpel after the instillation   of 0.25% Marcaine with epinephrine, using her previous incision.  The incision   was carried down to the level of the fascia.  The S retractors were placed   within the incision.  The fascia was grasped with Kocher clamps, elevated and   incised with Curiel scissors.  The incision was extended laterally with Curiel   scissors.  Either side of the fascial incision was sutured with 0 Vicryl as    stay sutures.  The S retractors were placed again within this incision.  The   peritoneum was identified, grasped with Pean clamp, elevated, and incised with   Metzenbaum scissors.  The incision was extended laterally with Metzenbaum   scissors.  The S retractors were placed within the incision and   intra-abdominal entry was confirmed.  The Cholo trocar was inserted under   direct visualization.  The CO2 gas was connected and the opening pressure was   7 mmHg.  The patient was placed in Trendelenburg.  The patient's pelvis and   upper abdomen were explored and the findings are noted as above.  The lateral   trocar sites were identified bilaterally by transilluminating the anterior   abdominal wall.  The left 5 mm trocar site was identified.  A 5 mm skin   incision was made with the scalpel after the instillation of 0.25% Marcaine   with epinephrine.  The 5 mm trocar was inserted under direct visualization.    The right 5 mm trocar site was identified.  A 5 mm skin incision was made with   the scalpel after the instillation of 0.25% Marcaine with epinephrine.  The 5   mm trocar was inserted under direct visualization.  The uterus was elevated   using the mCASHare manipulator and the left ureter was identified coursing   through the medial leafs of the broad ligament.  The left fallopian tube and   ovary were identified and grasped with the Prestige clamp.  Lysis of adhesions   was performed using the LigaSure.  These were cauterized and then incised.    The left IP ligament was cauterized in three proximal locations and then   incised.  The left round ligament was cauterized in three proximal locations   and then incised.  The intervening broad ligament tissues were cauterized and   then incised.  One blade of the LigaSure was placed within the broad ligament   and this was then elevated.  The areolar tissue was elevated, cauterized, and   then incised.  Following along the vesicouterine peritoneum, this was grasped    with the Prestige clamp, elevated, cauterized, and then incised.  The Endo   Peanut was used to perform blunt dissection pushing the bladder off the lower   uterine segment of the uterus.  The left uterine artery was identified,   cauterized in three proximal locations and then incised.  The left uterosacral   ligament was cauterized and then incised.  Attention was turned to the right   side of the pelvis.  The right ureter was identified coursing through the   medial leaf of the broad ligament.  The right tube and ovary were identified.    The fallopian tube was followed out to the fimbriated end and was grasped   with the Prestige clamp.  The fallopian tube was cauterized near its   attachments to the ovary in three proximal locations.  Following along the   mesosalpinx mesovarium, the remaining attachments of the fallopian tube to the   ovary were cauterized and then incised.  The utero-ovarian ligament was   cauterized and then incised in three proximal locations.  The right round   ligament was cauterized and then incised.  The broad ligament tissues between   the round ligament and the utero-ovarian ligament were cauterized and then   incised.    The vesicouterine peritoneum was elevated and the anterior leaf of the broad   ligament was entered and following along to the midline of the uterus, the   remainder of the bladder flap was created.  The bladder pillars were   cauterized and then incised using the Endo Peanut.  The bladder was pushed off   the lower uterine segment.  The cup of the VCare could be palpated using the   Endo Peanut.  The right uterine artery was cauterized in three proximal   locations and then incised.  The right uterosacral ligament was cauterized and   then incised.  The VCare manipulator was used to push the uterus up into the   pelvis and the cervical cup could easily be palpated.  The colpotomy was   performed using the monopolar J hook.  A circumferential incision was made    around the cervix in the usual fashion.  The vaginal cuff was identified and   found to be hemostatic.    The patient was placed in high lithotomy position.  The VCare manipulator was   delivered through the vagina.  The uterus and cervix were delivered to the   level of the vaginal cuff.  A right angle retractor was placed within the   vagina.  The cervix was visualized, grasped with a ring forceps and delivered   through the vagina.  The specimen which contained the bilateral tubes, left   ovary, uterus, and cervix was sent to pathology.  The long weighted speculum   was inserted into the vagina.  The vaginal cuff was grasped with Allis clamps.    The bilateral vaginal cuff angles were reapproximated with 0 Vicryl in the   usual fashion.  The remainder of the vaginal cuff was reapproximated with 0   Vicryl in a running locked fashion.  The vaginal cuff was irrigated with   sterile water and found to be hemostatic.    The laparoscopic instruments and camera were removed from the patient's   abdomen.    Attention was again turned to the laparoscopic portion of the procedure.  The   CO2 gas was disconnected and the light was placed on standby.  The CO2 gas was   reconnected.  The laparoscope was reinserted.  The patient was placed in   Trendelenburg.  The vaginal cuff and all pedicles were irrigated copiously   with saline and there were no areas of active bleeding.  Lorie was placed   along the vaginal cuff.    The trocars were removed under direct visualization.  The CO2 gas was released   from the patient's abdomen.  The fascia was reapproximated using the stay   sutures, then 0 Vicryl suture.  The subcutaneous tissues reapproximated using   0 Vicryl suture and the skin was closed with 4-0 Vicryl.  The bilateral 5 mm   trocar sites skin was closed with 4-0 Vicryl and then all incisions were   coated in Dermabond.    The Fox catheter was removed from the patient's urinary bladder.  A   30-degree cystoscope was  inserted.  The patient had received indigo carmine   dye IV and a cystoscopy was performed.  Bilateral efflux of blue urine were   seen through both ureteral orifices.  The bladder mucosa appeared intact with   no masses or lesions.    The cystoscope was removed from the patient's urinary bladder.  The Fox   catheter was replaced within the patient's urinary bladder.  The patient was   taken out of dorsal lithotomy position.  She was extubated and taken to the   PACU in stable condition.  Sponge, lap, needle counts were correct x2.  The   patient tolerated the procedure without complications.       ____________________________________     MD GARTH PIERSON / NTS    DD:  07/11/2018 17:57:17  DT:  07/11/2018 20:01:58    D#:  [id number]  Job#:  [id number]

## (undated) DEVICE — SET SUCTION/IRRIGATION WITH DISPOSABLE TIP (6/CA )PART #0250-070-520 IS A SUB

## (undated) DEVICE — SUTURE 2-0 VICRYL PLUS CT-1 36 (36PK/BX)"

## (undated) DEVICE — TUBING INFLOW HYSTEROSCOPY (10EA/CA)

## (undated) DEVICE — SET EXTENSION WITH 2 PORTS (48EA/CA) ***PART #2C8610 IS A SUBSTITUTE*****

## (undated) DEVICE — LACTATED RINGERS INJ 1000 ML - (14EA/CA 60CA/PF)

## (undated) DEVICE — ARMREST CRADLE FOAM - (2PR/PK 12PR/CA)

## (undated) DEVICE — SENSOR SPO2 NEO LNCS ADHESIVE (20/BX) SEE USER NOTES

## (undated) DEVICE — TROCAR LAPSCP 100MM 12MM NTHRD - (6/BX)

## (undated) DEVICE — TRAY SRGPRP PVP IOD WT PRP - (20/CA)

## (undated) DEVICE — MASK ANESTHESIA ADULT  - (100/CA)

## (undated) DEVICE — GOWN SURGEONS X-LARGE - DISP. (30/CA)

## (undated) DEVICE — BLADE SURGICAL CLIPPER - (50EA/CA)

## (undated) DEVICE — GLOVE SZ 6.5 BIOGEL PI MICRO - PF LF (50PR/BX)

## (undated) DEVICE — STERI STRIP COMPOUND BENZOIN - TINCTURE 0.6ML WITH APPLICATOR (40EA/BX)

## (undated) DEVICE — KIT  I.V. START (100EA/CA)

## (undated) DEVICE — ELECTRODE 850 FOAM ADHESIVE - HYDROGEL RADIOTRNSPRNT (50/PK)

## (undated) DEVICE — TUBE E-T HI-LO CUFF 7.5MM (10EA/PK)

## (undated) DEVICE — GOWN WARMING STANDARD FLEX - (30/CA)

## (undated) DEVICE — DRAPE UNDER BUTTOCKS FLUID - (20/CA)

## (undated) DEVICE — GLOVE BIOGEL SZ 7 SURGICAL PF LTX - (50PR/BX 4BX/CA)

## (undated) DEVICE — PACK LAPAROSCOPY - (1/CA)

## (undated) DEVICE — KIT ANESTHESIA W/CIRCUIT & 3/LT BAG W/FILTER (20EA/CA)

## (undated) DEVICE — CATHETER IV 20 GA X 1-1/4 ---SURG.& SDS ONLY--- (50EA/BX)

## (undated) DEVICE — MASK, LARYNGEAL AIRWAY #4

## (undated) DEVICE — Device

## (undated) DEVICE — UTERINE MANIP RUMI 6.7X6 - (5/BX)

## (undated) DEVICE — NEPTUNE 4 PORT MANIFOLD - (20/PK)

## (undated) DEVICE — DRESSING TRANSPARENT FILM TEGADERM 4 X 4.75" (50EA/BX)"

## (undated) DEVICE — CANISTER SUCTION 3000ML MECHANICAL FILTER AUTO SHUTOFF MEDI-VAC NONSTERILE LF DISP  (40EA/CA)

## (undated) DEVICE — SUTURE 0 VICRYL PLUS CT-1 - 36 INCH (36/BX)

## (undated) DEVICE — APPICATOR HEMOSTAT ARISTA XL - FLEXITIP (10EA/CA)

## (undated) DEVICE — PAD SANITARY 11IN MAXI IND WRAPPED  (12EA/PK 24PK/CA)

## (undated) DEVICE — HEAD HOLDER JUNIOR/ADULT

## (undated) DEVICE — CANISTER SUCTION RIGID RED 1500CC (40EA/CA)

## (undated) DEVICE — TUBING OUTFLOW HYSTEROSCPY (10EA/BX)

## (undated) DEVICE — TRAY FOLEY CATHETER STATLOCK 16FR SURESTEP  (10EA/CA)

## (undated) DEVICE — SODIUM CHL IRRIGATION 0.9% 1000ML (12EA/CA)

## (undated) DEVICE — DRAPESURG STERI-DRAPE LONG - (10/BX 4BX/CA)

## (undated) DEVICE — CLOSURE SKIN STRIP 1/2 X 4 IN - (STERI STRIP) (50/BX 4BX/CA)

## (undated) DEVICE — PROTECTOR ULNA NERVE - (36PR/CA)

## (undated) DEVICE — SUTURE GENERAL

## (undated) DEVICE — SUCTION INSTRUMENT YANKAUER BULBOUS TIP W/O VENT (50EA/CA)

## (undated) DEVICE — SPECIMEN PLASTIC CONVERTOR - (10/CA)

## (undated) DEVICE — DERMABOND ADVANCED - (12EA/BX)

## (undated) DEVICE — ADHESIVE DERMABOND HVD MINI (12EA/BX)

## (undated) DEVICE — SUTURE 4-0 VICRYL PLUS FS-2 - 27 INCH (36/BX)

## (undated) DEVICE — SLEEVE, VASO, THIGH, MED

## (undated) DEVICE — HEMOSTAT ARISTA PWD 5 GRAM - (5/BX)

## (undated) DEVICE — SODIUM CHL. IRRIGATION 0.9% 3000ML (4EA/CA 65CA/PF)

## (undated) DEVICE — WATER IRRIG. STER 3000 ML - (4/CA)

## (undated) DEVICE — GLOVE BIOGEL INDICATOR SZ 7SURGICAL PF LTX - (50/BX 4BX/CA)

## (undated) DEVICE — TUBING CLEARLINK DUO-VENT - C-FLO (48EA/CA)

## (undated) DEVICE — LIGASURE 5MM BLUNT TIP LONG - 44CM (6EA/PK)

## (undated) DEVICE — BANDAID SHEER STRIP 3/4 IN (100EA/BX 12BX/CA)

## (undated) DEVICE — TUBE E-T HI-LO CUFF 7.0MM (10EA/PK)

## (undated) DEVICE — GLOVE BIOGEL SZ 6.5 SURGICAL PF LTX (50PR/BX 4BX/CA)

## (undated) DEVICE — SET IRRIGATION CYSTOSCOPY TUBE L80 IN (20EA/CA)

## (undated) DEVICE — ENDO PEANUT 5MM DEVICE (12EA/BX)

## (undated) DEVICE — TUBE CONNECTING SUCTION - CLEAR PLASTIC STERILE 72 IN (50EA/CA)

## (undated) DEVICE — CHLORAPREP 26 ML APPLICATOR - ORANGE TINT(25/CA)

## (undated) DEVICE — TUBING SETDISPOS HIGH FLOW II - (10/BX)

## (undated) DEVICE — SUTURE 0 VICRYL PLUS CT-2 - 27 INCH (36/BX)

## (undated) DEVICE — ELECTRODE 5MM LHK LAPSCP STERILE DISP- MEGADYNE  (5/CA)

## (undated) DEVICE — ELECTRODE DUAL RETURN W/ CORD - (50/PK)

## (undated) DEVICE — SPONGE GAUZESTER. 2X2 4-PL - (2/PK 50PK/BX 30BX/CS)

## (undated) DEVICE — BANDAID X-LARGE 2 X 4 IN LF (50EA/BX)

## (undated) DEVICE — GLOVE BIOGEL SZ 6 PF LATEX - (50EA/BX 4BX/CA)

## (undated) DEVICE — SET LEADWIRE 5 LEAD BEDSIDE DISPOSABLE ECG (1SET OF 5/EA)

## (undated) DEVICE — GOWN SURGEONS LARGE - (32/CA)

## (undated) DEVICE — TROCAR 5X100 BLADED ADVANCE - FIXATION (6/BX)

## (undated) DEVICE — WATER IRRIGATION STERILE 1000ML (12EA/CA)

## (undated) DEVICE — LEAD SET 6 DISP. EKG NIHON KOHDEN

## (undated) DEVICE — SUTURE 3-0 VICRYL PLUS SH - 8X 18 INCH (12/BX)

## (undated) DEVICE — SUTURE 0 VICRYL PLUS UR-6 - 27 INCH (36/BX)

## (undated) DEVICE — LIGASURE LAPAROSCOPIC 5MM - (6EA/CA)

## (undated) DEVICE — ELECTRODE MONOPOLAR ANGLED CUTTING LOOP DIAM 0.35 YELLOW 24FR (6EA/PK)

## (undated) DEVICE — GLOVE BIOGEL SZ 8 SURGICAL PF LTX - (50PR/BX 4BX/CA)

## (undated) DEVICE — SOLUTION SORBITOL 3000ML (4/CA)

## (undated) DEVICE — SYRINGE 10 ML CONTROL LL (25EA/BX 4BX/CA)

## (undated) DEVICE — APPLICATOR EVICEL TIP   35CM - (3/BX)